# Patient Record
Sex: MALE | Race: WHITE
[De-identification: names, ages, dates, MRNs, and addresses within clinical notes are randomized per-mention and may not be internally consistent; named-entity substitution may affect disease eponyms.]

---

## 2017-05-13 ENCOUNTER — HOSPITAL ENCOUNTER (INPATIENT)
Dept: HOSPITAL 61 - ER | Age: 62
LOS: 4 days | Discharge: NURSING, REHAB OR LONG-TERM CARE | DRG: 871 | End: 2017-05-17
Attending: INTERNAL MEDICINE | Admitting: INTERNAL MEDICINE
Payer: COMMERCIAL

## 2017-05-13 VITALS — DIASTOLIC BLOOD PRESSURE: 73 MMHG | SYSTOLIC BLOOD PRESSURE: 148 MMHG

## 2017-05-13 VITALS — BODY MASS INDEX: 34.07 KG/M2 | WEIGHT: 238 LBS | HEIGHT: 70 IN

## 2017-05-13 VITALS — DIASTOLIC BLOOD PRESSURE: 53 MMHG | SYSTOLIC BLOOD PRESSURE: 91 MMHG

## 2017-05-13 DIAGNOSIS — Z95.5: ICD-10-CM

## 2017-05-13 DIAGNOSIS — B96.89: ICD-10-CM

## 2017-05-13 DIAGNOSIS — M48.00: ICD-10-CM

## 2017-05-13 DIAGNOSIS — E11.51: ICD-10-CM

## 2017-05-13 DIAGNOSIS — G92: ICD-10-CM

## 2017-05-13 DIAGNOSIS — J45.909: ICD-10-CM

## 2017-05-13 DIAGNOSIS — Z88.1: ICD-10-CM

## 2017-05-13 DIAGNOSIS — G89.29: ICD-10-CM

## 2017-05-13 DIAGNOSIS — N39.0: ICD-10-CM

## 2017-05-13 DIAGNOSIS — M54.5: ICD-10-CM

## 2017-05-13 DIAGNOSIS — I95.9: ICD-10-CM

## 2017-05-13 DIAGNOSIS — A04.7: ICD-10-CM

## 2017-05-13 DIAGNOSIS — I10: ICD-10-CM

## 2017-05-13 DIAGNOSIS — I25.10: ICD-10-CM

## 2017-05-13 DIAGNOSIS — A41.9: Primary | ICD-10-CM

## 2017-05-13 DIAGNOSIS — Z83.3: ICD-10-CM

## 2017-05-13 DIAGNOSIS — Z89.611: ICD-10-CM

## 2017-05-13 DIAGNOSIS — Z82.49: ICD-10-CM

## 2017-05-13 DIAGNOSIS — Z99.2: ICD-10-CM

## 2017-05-13 DIAGNOSIS — E78.00: ICD-10-CM

## 2017-05-13 LAB
ALBUMIN SERPL-MCNC: 3.3 G/DL (ref 3.4–5)
ALBUMIN/GLOB SERPL: 0.9 {RATIO} (ref 1–1.7)
ALP SERPL-CCNC: 89 U/L (ref 46–116)
ALT SERPL-CCNC: 27 U/L (ref 16–63)
ANION GAP SERPL CALC-SCNC: 6 MMOL/L (ref 6–14)
APTT BLD: 34 SEC (ref 24–38)
AST SERPL-CCNC: 23 U/L (ref 15–37)
BACTERIA #/AREA URNS HPF: (no result) /HPF
BARBITURATES UR-MCNC: (no result) UG/ML
BASOPHILS # BLD AUTO: 0.1 X10^3/UL (ref 0–0.2)
BASOPHILS NFR BLD: 1 % (ref 0–3)
BENZODIAZ UR-MCNC: (no result) UG/L
BILIRUB SERPL-MCNC: 0.3 MG/DL (ref 0.2–1)
BILIRUB UR QL STRIP: NEGATIVE
BUN SERPL-MCNC: 32 MG/DL (ref 8–26)
BUN/CREAT SERPL: 29 (ref 6–20)
CALCIUM SERPL-MCNC: 8.5 MG/DL (ref 8.5–10.1)
CANNABINOIDS UR-MCNC: (no result) UG/L
CHLORIDE SERPL-SCNC: 101 MMOL/L (ref 98–107)
CO2 SERPL-SCNC: 33 MMOL/L (ref 21–32)
COCAINE UR-MCNC: (no result) NG/ML
CREAT SERPL-MCNC: 1.1 MG/DL (ref 0.7–1.3)
EOSINOPHIL NFR BLD: 2 % (ref 0–3)
ERYTHROCYTE [DISTWIDTH] IN BLOOD BY AUTOMATED COUNT: 15.2 % (ref 11.5–14.5)
ETHANOL SERPL-MCNC: < 10 MG/DL (ref 0–10)
GFR SERPLBLD BASED ON 1.73 SQ M-ARVRAT: 67.4 ML/MIN
GLOBULIN SER-MCNC: 3.7 G/DL (ref 2.2–3.8)
GLUCOSE SERPL-MCNC: 148 MG/DL (ref 70–99)
GLUCOSE UR STRIP-MCNC: NEGATIVE MG/DL
HCT VFR BLD CALC: 48.2 % (ref 39–53)
HGB BLD-MCNC: 15.8 G/DL (ref 13–17.5)
INR PPP: 1 (ref 0.8–1.1)
LYMPHOCYTES # BLD: 3.3 X10^3/UL (ref 1–4.8)
LYMPHOCYTES NFR BLD AUTO: 37 % (ref 24–48)
MCH RBC QN AUTO: 28 PG (ref 25–35)
MCHC RBC AUTO-ENTMCNC: 33 G/DL (ref 31–37)
MCV RBC AUTO: 87 FL (ref 79–100)
METHADONE SERPL-MCNC: (no result) NG/ML
MONOCYTES NFR BLD: 9 % (ref 0–9)
NEUTROPHILS NFR BLD AUTO: 52 % (ref 31–73)
NITRITE UR QL STRIP: NEGATIVE
OPIATES UR-MCNC: (no result) NG/ML
PCP SERPL-MCNC: (no result) MG/DL
PH UR STRIP: 5.5 [PH]
PLATELET # BLD AUTO: 250 X10^3/UL (ref 140–400)
POTASSIUM SERPL-SCNC: 5 MMOL/L (ref 3.5–5.1)
PROT SERPL-MCNC: 7 G/DL (ref 6.4–8.2)
PROT UR STRIP-MCNC: NEGATIVE MG/DL
PROTHROMBIN TIME: 12.9 SEC (ref 11.7–14)
RBC # BLD AUTO: 5.56 X10^6/UL (ref 4.3–5.7)
RBC #/AREA URNS HPF: 0 /HPF (ref 0–2)
SODIUM SERPL-SCNC: 140 MMOL/L (ref 136–145)
SP GR UR STRIP: 1.01
SQUAMOUS #/AREA URNS LPF: (no result) /LPF
UROBILINOGEN UR-MCNC: 0.2 MG/DL
WBC # BLD AUTO: 8.8 X10^3/UL (ref 4–11)
WBC #/AREA URNS HPF: >40 /HPF (ref 0–4)

## 2017-05-13 PROCEDURE — 83735 ASSAY OF MAGNESIUM: CPT

## 2017-05-13 PROCEDURE — 85610 PROTHROMBIN TIME: CPT

## 2017-05-13 PROCEDURE — 81001 URINALYSIS AUTO W/SCOPE: CPT

## 2017-05-13 PROCEDURE — 80329 ANALGESICS NON-OPIOID 1 OR 2: CPT

## 2017-05-13 PROCEDURE — 71010: CPT

## 2017-05-13 PROCEDURE — 87186 SC STD MICRODIL/AGAR DIL: CPT

## 2017-05-13 PROCEDURE — 85730 THROMBOPLASTIN TIME PARTIAL: CPT

## 2017-05-13 PROCEDURE — 84132 ASSAY OF SERUM POTASSIUM: CPT

## 2017-05-13 PROCEDURE — 82962 GLUCOSE BLOOD TEST: CPT

## 2017-05-13 PROCEDURE — 80048 BASIC METABOLIC PNL TOTAL CA: CPT

## 2017-05-13 PROCEDURE — 93306 TTE W/DOPPLER COMPLETE: CPT

## 2017-05-13 PROCEDURE — 83605 ASSAY OF LACTIC ACID: CPT

## 2017-05-13 PROCEDURE — 93005 ELECTROCARDIOGRAM TRACING: CPT

## 2017-05-13 PROCEDURE — 87086 URINE CULTURE/COLONY COUNT: CPT

## 2017-05-13 PROCEDURE — 83880 ASSAY OF NATRIURETIC PEPTIDE: CPT

## 2017-05-13 PROCEDURE — 96375 TX/PRO/DX INJ NEW DRUG ADDON: CPT

## 2017-05-13 PROCEDURE — 84484 ASSAY OF TROPONIN QUANT: CPT

## 2017-05-13 PROCEDURE — 80053 COMPREHEN METABOLIC PANEL: CPT

## 2017-05-13 PROCEDURE — 96361 HYDRATE IV INFUSION ADD-ON: CPT

## 2017-05-13 PROCEDURE — 70450 CT HEAD/BRAIN W/O DYE: CPT

## 2017-05-13 PROCEDURE — 85027 COMPLETE CBC AUTOMATED: CPT

## 2017-05-13 PROCEDURE — 87324 CLOSTRIDIUM AG IA: CPT

## 2017-05-13 PROCEDURE — 87040 BLOOD CULTURE FOR BACTERIA: CPT

## 2017-05-13 PROCEDURE — 36415 COLL VENOUS BLD VENIPUNCTURE: CPT

## 2017-05-13 PROCEDURE — 96365 THER/PROPH/DIAG IV INF INIT: CPT

## 2017-05-13 PROCEDURE — 87641 MR-STAPH DNA AMP PROBE: CPT

## 2017-05-13 RX ADMIN — BACITRACIN SCH MLS/HR: 5000 INJECTION, POWDER, FOR SOLUTION INTRAMUSCULAR at 16:00

## 2017-05-13 RX ADMIN — BACITRACIN SCH MLS/HR: 5000 INJECTION, POWDER, FOR SOLUTION INTRAMUSCULAR at 23:50

## 2017-05-13 NOTE — RAD
Examination: Single frontal view chest



History: Shortness of breath, altered mental status.



Comparison: 5/20/2014



Findings:



The cardiomediastinal silhouette grossly appears unremarkable. There is no

acute infiltrate or visualized pneumothorax. Right clavicle hardware is

unchanged.



Impression: No acute cardiopulmonary findings.
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
## 2017-05-13 NOTE — RAD
CT head without contrast    



History: Altered mental status.



Comparison: None.



Procedure: Axial images are obtained of the head from the skull base through

the vertex without IV contrast.



Findings: Mild bilateral periventricular white matter hypodensities likely

chronic small vessel ischemic disease. The ventricles and sulci are normal for

the patient's age. No mass-effect, intracranial mass, midline shift,

hemorrhage or obvious acute infarction is identified.  Basilar cisterns are

patent.  Bone windows demonstrate no significant calvarial abnormality. The

visualized paranasal sinuses appear clear.    



Impression: 



    1. No acute intracranial process.     



PQRS Compliance Statement:



One or more of the following individualized dose reduction techniques were

utilized for this examination:

1. Automated exposure control

2. Adjustment of the mA and/or kV according to patient size

3. Use of iterative reconstruction technique
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
## 2017-05-13 NOTE — ACF
Admission Forms Criteria


                                           MENTAL STATUS CHANGE





   Clinical Indications for Inpatient Care    


                                                                     


                                                                 (Place 'X' for 

any and all applicable criteria):  





Ongoing inpatient care may be needed for 1 or more of the following(1)(2)(3)(5)(

6): 





[X]I.    Suspected serious etiology (eg, medical disorder, CNS event) of 

altered mental status


[ ]II.   Danger to self or others not manageable at lower level of care


[ ]III.  Grave disability (eg, inability to perform self care necessary at 

lower level of care)


[ ]IV. Agitation or inappropriate behavior interfering with care for primary 

condition (eg, attempting to discontinue


        lines or drains prematurely, unable to cooperate with respiratory care)


[ ]V.  Delirium [A] [D][E] as described by 1 or more of the following(26):


         [ ]a)  Delirium due to alcohol or sedative [F] withdrawal


         [ ]b)  Delirium of uncertain etiology that has not responded to 

appropriate empiric treatment


         [ ]c)  Delirium that prevents performance of a life-sustaining 

function (eg, feeding or hydrating oneself)





[ ]VI.  General contraindications and/or Inappropriate clinical situations for 

Observational Care in patients


          with Mental Status Change, when ANY ONE of the following is required:


          [ ]a)   Prediction of prolongation of LOS based on ANY ONE of the 

following may be considered as 


                    a contraindication for observational care 2, 3, 4, 5, 6, 7, 

8, 9, 10, 11


                    [ ]i)    Age > 65 yrs.


                    [ ]ii)   Patient arriving by ambulance


                    [ ]iii)  Patient with high acuity


                    [ ]iv)  Patient requiring vital sign monitoring


                    [ ]v)   Patient on IV medication


          [ ]b)   Systolic blood pressures  greater than or equal to 180mmHg 3,

12


          [ ]c)   Patient with altered mental status including delirium and 

other alteration of consciousness, (3)


          [ ]d)   Patient whose discharge disposition will be to a skilled 

nursing home or rehabilitation home should 


                   not be managed in Emergency Department Observation Unit. CMS 

rule requires 3 days hospital stay before such placement.3,13


          [ ]e)   Patient with failure to thrive due to broad array of 

etiologies 3,16,17


          [ ]f)   Inability to ambulate 3,14








Extended stay beyond goal length of stay for the primary condition may be 

needed until ALL of the following are present(3)(5):


[ ]a)  Underlying medical etiology of mental status change is absent, or has 

been established and adequately treated


[ ]b)  Danger to self or others is absent or manageable at lower level of care.


[ ]c)  Behavior crisis management, including physical or chemical restraints, 

is not required or available at lower level of car


[ ]d)  Substance or alcohol withdrawal is absent or manageable at lower level 

of care.


[ ]e)  Behavioral symptoms (eg, agitation, somnolence, inappropriate behavior) 

are absent, or are manageable at lower level of care.








The original [facility] content created by [facility] has been revised. 


The portions of the content which have been revised are identified through the 

use of italic text or in bold, and [facility] 


has neither reviewed nor approved the modified material. All other unmodified 

content is copyright  [facility].





Please see references footnoted in the original [facility] edition 

2016


Admission Criteria Met?:  Yes











TARAH BURGESS May 13, 2017 16:04

## 2017-05-14 VITALS — SYSTOLIC BLOOD PRESSURE: 179 MMHG | DIASTOLIC BLOOD PRESSURE: 83 MMHG

## 2017-05-14 VITALS — SYSTOLIC BLOOD PRESSURE: 171 MMHG | DIASTOLIC BLOOD PRESSURE: 69 MMHG

## 2017-05-14 VITALS — SYSTOLIC BLOOD PRESSURE: 175 MMHG | DIASTOLIC BLOOD PRESSURE: 86 MMHG

## 2017-05-14 VITALS — DIASTOLIC BLOOD PRESSURE: 85 MMHG | SYSTOLIC BLOOD PRESSURE: 175 MMHG

## 2017-05-14 VITALS — SYSTOLIC BLOOD PRESSURE: 158 MMHG | DIASTOLIC BLOOD PRESSURE: 85 MMHG

## 2017-05-14 VITALS — DIASTOLIC BLOOD PRESSURE: 83 MMHG | SYSTOLIC BLOOD PRESSURE: 169 MMHG

## 2017-05-14 VITALS — SYSTOLIC BLOOD PRESSURE: 217 MMHG | DIASTOLIC BLOOD PRESSURE: 92 MMHG

## 2017-05-14 RX ADMIN — OXYCODONE HYDROCHLORIDE AND ACETAMINOPHEN SCH MG: 500 TABLET ORAL at 16:00

## 2017-05-14 RX ADMIN — INSULIN DETEMIR SCH UNITS: 100 INJECTION, SOLUTION SUBCUTANEOUS at 21:51

## 2017-05-14 RX ADMIN — ESCITALOPRAM OXALATE SCH MG: 10 TABLET ORAL at 21:00

## 2017-05-14 RX ADMIN — FUROSEMIDE SCH MG: 40 TABLET ORAL at 16:00

## 2017-05-14 RX ADMIN — BACITRACIN SCH MLS/HR: 5000 INJECTION, POWDER, FOR SOLUTION INTRAMUSCULAR at 07:31

## 2017-05-14 RX ADMIN — PREGABALIN SCH MG: 75 CAPSULE ORAL at 21:00

## 2017-05-14 RX ADMIN — ATORVASTATIN CALCIUM SCH MG: 40 TABLET, FILM COATED ORAL at 21:00

## 2017-05-14 RX ADMIN — Medication SCH MG: at 21:51

## 2017-05-14 RX ADMIN — ONDANSETRON PRN MG: 2 INJECTION INTRAMUSCULAR; INTRAVENOUS at 19:10

## 2017-05-14 RX ADMIN — LOSARTAN POTASSIUM SCH MG: 50 TABLET ORAL at 16:00

## 2017-05-14 RX ADMIN — BACITRACIN SCH MLS/HR: 5000 INJECTION, POWDER, FOR SOLUTION INTRAMUSCULAR at 19:11

## 2017-05-14 RX ADMIN — HYDRALAZINE HYDROCHLORIDE PRN MG: 20 INJECTION INTRAMUSCULAR; INTRAVENOUS at 19:07

## 2017-05-14 RX ADMIN — HYDRALAZINE HYDROCHLORIDE PRN MG: 20 INJECTION INTRAMUSCULAR; INTRAVENOUS at 09:08

## 2017-05-14 RX ADMIN — MORPHINE SULFATE SCH MG: 15 TABLET, EXTENDED RELEASE ORAL at 21:00

## 2017-05-14 RX ADMIN — ASPIRIN 81 MG SCH MG: 81 TABLET ORAL at 16:00

## 2017-05-14 RX ADMIN — PROCHLORPERAZINE EDISYLATE PRN MG: 5 INJECTION INTRAMUSCULAR; INTRAVENOUS at 16:04

## 2017-05-14 RX ADMIN — Medication SCH MG: at 16:04

## 2017-05-14 RX ADMIN — ATENOLOL SCH MG: 50 TABLET ORAL at 21:00

## 2017-05-14 RX ADMIN — INSULIN ASPART SCH UNITS: 100 INJECTION, SOLUTION INTRAVENOUS; SUBCUTANEOUS at 16:30

## 2017-05-14 RX ADMIN — HYDRALAZINE HYDROCHLORIDE PRN MG: 20 INJECTION INTRAMUSCULAR; INTRAVENOUS at 13:17

## 2017-05-14 RX ADMIN — PREGABALIN SCH MG: 75 CAPSULE ORAL at 15:00

## 2017-05-14 RX ADMIN — METOCLOPRAMIDE PRN MG: 5 INJECTION, SOLUTION INTRAMUSCULAR; INTRAVENOUS at 09:54
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
## 2017-05-14 NOTE — EKG
[facility]

              [address] [place] [address]

Test Date:    2017               Test Time:    13:40:56

Pat Name:     ZUNILDA MCMANUS              Department:   

Patient ID:   [id number]           Room:         252 1

Gender:       Male                     Technician:   

:          1955               Requested By: JODIE KASPER

Order Number: [id number]            Reading MD:   Blas Garcia

                                 Measurements

Intervals                              Axis          

Rate:         53                       P:            41

TN:           202                      QRS:          -26

QRSD:         88                       T:            -54

QT:           388                                    

QTc:          366                                    

                           Interpretive Statements

SINUS RHYTHM

NON-SPECIFIC ST/T CHANGES

CANNOT RULE OUT INFERO-LATERAL ISCHEMIA

Electronically Signed On 5- 15:01:52 CDT by Blas Garcia

## 2017-05-14 NOTE — PDOC2
CARDIOLOGY CONSULT NOTE


CHEIF COMPLAINT:


Confusion


Problems:  





HPI:


62 y.o male with mmp presenting with mental status changes





Pulled fire alarm at [facility] for unclear reasons. 


Found to have urosepsis in ER


Cardiology called for elevated troponin and non-specific EKG changes


Poor historian. 


Denies any recent chest pain, dyspnea. 


Currently struggling with nausea. 


Reports prior history of PCI but also notes some abn recent stress testing. ? 

Unclear historian. 





Trop peak at 0.1





PMHX:


Biliary stents


HTN


DM2


PAD s/p R aka


CAD s/p PCI





SOCHX:


Lives at [facility]. Denies any alcohol, tob or illicits.





FAMHX:


NC





CURRENT MEDS:





Current Medications








 Medications


  (Trade)  Dose


 Ordered  Sig/Shana  Start Time


 Stop Time Status Last Admin


Dose Admin


 


 Acetaminophen


  (Tylenol)  650 mg  PRN Q4HRS  PRN  5/13/17 15:15


 5/14/17 15:14   


 


 


 Aspirin


  (Bj Aspirin)  325 mg  1X  ONCE  5/13/17 16:45


 5/13/17 16:46 DC 5/13/17 16:44


325 MG


 


 Azithromycin  250 ml @ 


 250 mls/hr  1X  ONCE  5/13/17 15:30


 5/13/17 16:29 DC 5/13/17 16:44


250 MLS/HR


 


 Azithromycin 500


 mg/Sodium Chloride  250 ml @ 


 250 mls/hr  1X  ONCE  5/13/17 15:15


 5/13/17 16:14 UNV  


 


 


 Ceftriaxone


 Sodium 1 gm/


 Sodium Chloride  50 ml @ 


 100 mls/hr  Q24H  5/14/17 09:00


     


 


 


 Ceftriaxone Sodium  50 ml @ 


 100 mls/hr  1X  ONCE  5/13/17 15:15


 5/13/17 15:44 DC 5/13/17 15:59


100 MLS/HR


 


 Morphine Sulfate  2 mg  PRN Q2HR  PRN  5/13/17 15:15


 5/14/17 15:14   


 


 


 Naloxone HCl


  (Narcan)  0.4 mg  1X  ONCE  5/13/17 15:15


 5/13/17 15:24 DC 5/13/17 15:35


0.4 MG


 


 Ondansetron HCl


  (Zofran)  4 mg  PRN Q8HRS  PRN  5/13/17 15:15


 5/14/17 15:14  5/14/17 08:39


4 MG


 


 Sodium Chloride  1,000 ml @ 


 1,000 mls/hr  1X  ONCE  5/13/17 15:15


 5/13/17 16:14 DC 5/13/17 14:00


1,000 MLS/HR











ALLERGIES:





Allergies








Coded Allergies Type Severity Reaction Last Updated Verified


 


  levofloxacin Allergy Intermediate  9/23/14 Yes











ROS:


Negative for 10/14 systems reviewed unless otherwise noted above in HPI.





PHYSICAL EXAM:


Vital Signs:





Vital Signs








  Date Time  Temp Pulse Resp B/P (MAP) Pulse Ox O2 Delivery O2 Flow Rate FiO2


 


5/14/17 02:33 98.0 72 18 171/69 (103) 93 Nasal Cannula 2.0 





 98.0       








I & O











Intake and Output 


 


 5/14/17





 07:00


 


Intake Total 2650 ml


 


Balance 2650 ml


 


 


 


Intake Oral 100 ml


 


IV Total 2550 ml


 


# Voids 6


 


# Bowel Movements 1








Physical Exam:


Gen: He is distressed from n/v


HEENT: NC/AT. EOMI


CVS: RRR. no m/r/g, distant heart sounds


PULM: Decreased breath sounds at the bases. 


ABD: Soft, mildly tender to palpation. 


EXT: No edema in the LLE. Diminished pulses.





DIAGNOSTIC TESTING:


Cr 1.1, Trop peak 0.1. 


, UA > 40 wbc


CT head and cXR negative. 


EKG with SR and non-specific ST/T changes. No clear HU





ASSESSMENT:


1. Elevated troponin in the setting of urosepsis


2. HTN


3. PAD


4. CAD s/p prior remote PCI.





PLAN:


1. Continue home meds. 


2. Currently his EKG and biomarkers in conjunction with his symptoms/

presentation do not seem to represent a ischemic insult, nonetheless given his 

risk factors, he probably warrants a stress evaluation after stabilization of 

his urosepsis issues. 


3. Supportive care with IV hydralazine for BP control until able to tolerate 

p.o. 


4. Suspect trop elevation due to infection. Continue to monitor for symptoms.











PORSCHE THOMPSON MD May 14, 2017 09:04

## 2017-05-14 NOTE — PDOC
PROGRESS NOTES


Chief Complaint


Chief Complaint


AMS








ASSESSMENT AND PLAN:


1.  Toxic encephalopathy from infect (GNR UTI).  improving


2.  UTI:  GNR in prelim report.  ceftriax empirically


3.  C.diff colitis: new dx.  start vanco PO


4.  DM: well controlled on current regimen.  


5.  HTN:  poorly controlled today (recovered from hypotension).  resume home 

regimen


6.  PAD:  hx AKA.  no acute issues


7.  Chronic back pain:  cont home pain regimen


8.  Prophylaxis: lovenox





History of Present Illness


History of Present Illness


feels poorly today.  generalized malaise, myalgias.  denies abd pain.  + 

diarrhea





Vitals


Vitals





Vital Signs








  Date Time  Temp Pulse Resp B/P (MAP) Pulse Ox O2 Delivery O2 Flow Rate FiO2


 


5/14/17 13:17  109  179/83    


 


5/14/17 11:49 98.0  22  93 Room Air  





 98.0       


 


5/14/17 08:15       2.0 











Physical Exam


General:  Alert, Cooperative, No acute distress


Heart:  Regular rate


Lungs:  Clear


Abdomen:  No tenderness, Other (decreased bowel sounds)


Extremities:  Other (trace edema L LE.  AKA R)


Skin:  No rashes





Labs


LABS





Laboratory Tests








Test


  5/13/17


15:50 5/13/17


19:45 5/13/17


19:49 5/13/17


20:45


 


Lactic Acid Level


  2.0 mmol/L


(0.4-2.0) 


  


  


 


 


Nasal Screen MRSA (PCR)


  


  Negative


(Negative) 


  


 


 


Glucose (Fingerstick)


  


  


  109 mg/dL


(70-99) 


 


 


Troponin I Quantitative


  


  


  


  0.074 ng/mL


(0.000-0.055)


 


Test


  5/13/17


21:12 5/14/17


03:30 5/14/17


06:30 5/14/17


08:41


 


Glucose (Fingerstick)


  170 mg/dL


(70-99) 


  


  151 mg/dL


(70-99)


 


Troponin I Quantitative


  


  0.100 ng/mL


(0.000-0.055) 


  


 


 


Clostridium difficile Toxin


(PCR) 


  


  Positive


(Negative) 


 


 


Test


  5/14/17


11:41 5/14/17


14:00 


  


 


 


Glucose (Fingerstick)


  175 mg/dL


(70-99) 


  


  


 


 


Troponin I Quantitative


  


  0.061 ng/mL


(0.000-0.055) 


  


 

















ELVIRA GROSS MD May 14, 2017 14:57

## 2017-05-15 VITALS — SYSTOLIC BLOOD PRESSURE: 186 MMHG | DIASTOLIC BLOOD PRESSURE: 90 MMHG

## 2017-05-15 VITALS — SYSTOLIC BLOOD PRESSURE: 124 MMHG | DIASTOLIC BLOOD PRESSURE: 84 MMHG

## 2017-05-15 VITALS — DIASTOLIC BLOOD PRESSURE: 54 MMHG | SYSTOLIC BLOOD PRESSURE: 98 MMHG

## 2017-05-15 VITALS — DIASTOLIC BLOOD PRESSURE: 83 MMHG | SYSTOLIC BLOOD PRESSURE: 191 MMHG

## 2017-05-15 VITALS — SYSTOLIC BLOOD PRESSURE: 156 MMHG | DIASTOLIC BLOOD PRESSURE: 83 MMHG

## 2017-05-15 VITALS — DIASTOLIC BLOOD PRESSURE: 86 MMHG | SYSTOLIC BLOOD PRESSURE: 173 MMHG

## 2017-05-15 LAB
ANION GAP SERPL CALC-SCNC: 12 MMOL/L (ref 6–14)
BUN SERPL-MCNC: 10 MG/DL (ref 8–26)
CALCIUM SERPL-MCNC: 8.9 MG/DL (ref 8.5–10.1)
CHLORIDE SERPL-SCNC: 105 MMOL/L (ref 98–107)
CO2 SERPL-SCNC: 28 MMOL/L (ref 21–32)
CREAT SERPL-MCNC: 0.8 MG/DL (ref 0.7–1.3)
ERYTHROCYTE [DISTWIDTH] IN BLOOD BY AUTOMATED COUNT: 15.1 % (ref 11.5–14.5)
GFR SERPLBLD BASED ON 1.73 SQ M-ARVRAT: 97.3 ML/MIN
GLUCOSE SERPL-MCNC: 219 MG/DL (ref 70–99)
HCT VFR BLD CALC: 52.6 % (ref 39–53)
HGB BLD-MCNC: 17.7 G/DL (ref 13–17.5)
MAGNESIUM SERPL-MCNC: 1.5 MG/DL (ref 1.8–2.4)
MCH RBC QN AUTO: 28 PG (ref 25–35)
MCHC RBC AUTO-ENTMCNC: 34 G/DL (ref 31–37)
MCV RBC AUTO: 84 FL (ref 79–100)
PLATELET # BLD AUTO: 264 X10^3/UL (ref 140–400)
POTASSIUM SERPL-SCNC: 2.8 MMOL/L (ref 3.5–5.1)
RBC # BLD AUTO: 6.24 X10^6/UL (ref 4.3–5.7)
SODIUM SERPL-SCNC: 145 MMOL/L (ref 136–145)
WBC # BLD AUTO: 14 X10^3/UL (ref 4–11)

## 2017-05-15 RX ADMIN — PREGABALIN SCH MG: 75 CAPSULE ORAL at 20:50

## 2017-05-15 RX ADMIN — MORPHINE SULFATE SCH MG: 15 TABLET, EXTENDED RELEASE ORAL at 20:51

## 2017-05-15 RX ADMIN — CARISOPRODOL SCH MG: 350 TABLET ORAL at 09:00

## 2017-05-15 RX ADMIN — INSULIN ASPART SCH UNITS: 100 INJECTION, SOLUTION INTRAVENOUS; SUBCUTANEOUS at 11:30

## 2017-05-15 RX ADMIN — METOCLOPRAMIDE PRN MG: 5 INJECTION, SOLUTION INTRAMUSCULAR; INTRAVENOUS at 14:23

## 2017-05-15 RX ADMIN — INSULIN ASPART SCH UNITS: 100 INJECTION, SOLUTION INTRAVENOUS; SUBCUTANEOUS at 08:52

## 2017-05-15 RX ADMIN — ATENOLOL SCH MG: 50 TABLET ORAL at 14:28

## 2017-05-15 RX ADMIN — HYDRALAZINE HYDROCHLORIDE PRN MG: 20 INJECTION INTRAMUSCULAR; INTRAVENOUS at 03:26

## 2017-05-15 RX ADMIN — FUROSEMIDE SCH MG: 40 TABLET ORAL at 09:00

## 2017-05-15 RX ADMIN — ATORVASTATIN CALCIUM SCH MG: 40 TABLET, FILM COATED ORAL at 20:49

## 2017-05-15 RX ADMIN — ATENOLOL SCH MG: 50 TABLET ORAL at 21:00

## 2017-05-15 RX ADMIN — PREGABALIN SCH MG: 75 CAPSULE ORAL at 09:00

## 2017-05-15 RX ADMIN — BACITRACIN SCH MLS/HR: 5000 INJECTION, POWDER, FOR SOLUTION INTRAMUSCULAR at 08:37

## 2017-05-15 RX ADMIN — PROCHLORPERAZINE EDISYLATE PRN MG: 5 INJECTION INTRAMUSCULAR; INTRAVENOUS at 01:43

## 2017-05-15 RX ADMIN — METOCLOPRAMIDE PRN MG: 5 INJECTION, SOLUTION INTRAMUSCULAR; INTRAVENOUS at 03:26

## 2017-05-15 RX ADMIN — POTASSIUM CHLORIDE SCH MLS/HR: 7.46 INJECTION, SOLUTION INTRAVENOUS at 12:10

## 2017-05-15 RX ADMIN — ESCITALOPRAM OXALATE SCH MG: 10 TABLET ORAL at 20:49

## 2017-05-15 RX ADMIN — INSULIN DETEMIR SCH UNITS: 100 INJECTION, SOLUTION SUBCUTANEOUS at 21:03

## 2017-05-15 RX ADMIN — BACITRACIN SCH MLS/HR: 5000 INJECTION, POWDER, FOR SOLUTION INTRAMUSCULAR at 23:03

## 2017-05-15 RX ADMIN — POTASSIUM CHLORIDE SCH MLS/HR: 7.46 INJECTION, SOLUTION INTRAVENOUS at 10:06

## 2017-05-15 RX ADMIN — MORPHINE SULFATE SCH MG: 15 TABLET, EXTENDED RELEASE ORAL at 14:25

## 2017-05-15 RX ADMIN — ASPIRIN 81 MG SCH MG: 81 TABLET ORAL at 09:00

## 2017-05-15 RX ADMIN — Medication SCH MG: at 18:27

## 2017-05-15 RX ADMIN — Medication SCH MG: at 08:37

## 2017-05-15 RX ADMIN — INSULIN ASPART SCH UNITS: 100 INJECTION, SOLUTION INTRAVENOUS; SUBCUTANEOUS at 07:30

## 2017-05-15 RX ADMIN — POTASSIUM CHLORIDE SCH MLS/HR: 7.46 INJECTION, SOLUTION INTRAVENOUS at 11:05

## 2017-05-15 RX ADMIN — PREGABALIN SCH MG: 75 CAPSULE ORAL at 14:29

## 2017-05-15 RX ADMIN — OXYCODONE HYDROCHLORIDE AND ACETAMINOPHEN SCH MG: 500 TABLET ORAL at 09:00

## 2017-05-15 RX ADMIN — POTASSIUM CHLORIDE SCH MLS/HR: 7.46 INJECTION, SOLUTION INTRAVENOUS at 08:00

## 2017-05-15 RX ADMIN — INSULIN ASPART SCH UNITS: 100 INJECTION, SOLUTION INTRAVENOUS; SUBCUTANEOUS at 18:37

## 2017-05-15 RX ADMIN — ONDANSETRON PRN MG: 2 INJECTION INTRAMUSCULAR; INTRAVENOUS at 05:38

## 2017-05-15 RX ADMIN — Medication SCH MG: at 20:48

## 2017-05-15 RX ADMIN — LOSARTAN POTASSIUM SCH MG: 50 TABLET ORAL at 13:56

## 2017-05-15 RX ADMIN — Medication SCH MG: at 17:00

## 2017-05-15 NOTE — EKG
[facility]

              [address] [place] [address]

Test Date:    2017-05-15               Test Time:    07:59:45

Pat Name:     ZUNILDA MCMANUS              Department:   

Patient ID:   [id number]           Room:         534 1

Gender:       M                        Technician:   

:          [id number]               Requested By: JODIE KASPER

Order Number: [id number]            Reading MD:   Blas Garcia

                                 Measurements

Intervals                              Axis          

Rate:         76                       P:            43

CT:           188                      QRS:          -39

QRSD:         90                       T:            -65

QT:           448                                    

QTc:          509                                    

                           Interpretive Statements

SINUS RHYTHM

ANTERIOR ISCHEMIA OR LEFT VENTRICULAR STRAIN

LATERAL ISCHEMIA OR LEFT VENTRICULAR STRAIN

POSSIBLE INFERIOR INFARCT



Electronically Signed On 2017 10:43:44 CDT by Blas Garcia

## 2017-05-15 NOTE — PDOC
PROGRESS NOTES


Chief Complaint


Chief Complaint


 


1.  Toxic encephalopathy from infection


2.  UTI:  GNR  ceftriaxone


3.  C.diff colitis: new dx.  start vanco PO


4.  DM: well controlled on current regimen.  


5.  HTN:  poorly controlled today (recovered from hypotension).  resume home 

regimen


6.  PAD:  hx AKA.  no acute issues


7.  Chronic back pain:  cont home pain regimen


8.  Prophylaxis: lovenox





History of Present Illness


History of Present Illness


feels poorly today.  generalized malaise, myalgias.  denies abd pain.  + 

diarrhea





Vitals


Vitals





Vital Signs








  Date Time  Temp Pulse Resp B/P (MAP) Pulse Ox O2 Delivery O2 Flow Rate FiO2


 


5/15/17 05:39  97  183/79    


 


5/15/17 03:00 99.0  20  92 Nasal Cannula 2.0 





 99.0       











Physical Exam


General:  Alert, Cooperative, No acute distress


Heart:  Regular rate


Lungs:  Clear


Abdomen:  No tenderness, Other (decreased bowel sounds)


Extremities:  Other (trace edema L LE.  AKA R)


Skin:  No rashes





Labs


LABS





Laboratory Tests








Test


  5/14/17


17:20 5/14/17


20:55 5/15/17


05:40 5/15/17


07:21


 


Glucose (Fingerstick)


  206 mg/dL


(70-99) 225 mg/dL


(70-99) 


  214 mg/dL


(70-99)


 


White Blood Count


  


  


  14.0 x10^3/uL


(4.0-11.0) 


 


 


Red Blood Count


  


  


  6.24 x10^6/uL


(4.30-5.70) 


 


 


Hemoglobin


  


  


  17.7 g/dL


(13.0-17.5) 


 


 


Hematocrit


  


  


  52.6 %


(39.0-53.0) 


 


 


Mean Corpuscular Volume   84 fL ()  


 


Mean Corpuscular Hemoglobin   28 pg (25-35)  


 


Mean Corpuscular Hemoglobin


Concent 


  


  34 g/dL


(31-37) 


 


 


Red Cell Distribution Width


  


  


  15.1 %


(11.5-14.5) 


 


 


Platelet Count


  


  


  264 x10^3/uL


(140-400) 


 


 


Sodium Level


  


  


  145 mmol/L


(136-145) 


 


 


Potassium Level


  


  


  2.8 mmol/L


(3.5-5.1) 


 


 


Chloride Level


  


  


  105 mmol/L


() 


 


 


Carbon Dioxide Level


  


  


  28 mmol/L


(21-32) 


 


 


Anion Gap   12 (6-14)  


 


Blood Urea Nitrogen


  


  


  10 mg/dL


(8-26) 


 


 


Creatinine


  


  


  0.8 mg/dL


(0.7-1.3) 


 


 


Estimated GFR


(Cockcroft-Gault) 


  


  97.3 


  


 


 


Glucose Level


  


  


  219 mg/dL


(70-99) 


 


 


Calcium Level


  


  


  8.9 mg/dL


(8.5-10.1) 


 


 


Magnesium Level


  


  


  1.5 mg/dL


(1.8-2.4) 


 


 


Test


  5/15/17


10:50 


  


  


 


 


Glucose (Fingerstick)


  225 mg/dL


(70-99) 


  


  


 











Review of Systems


Review of Systems


+ nausea


on clears, poor PO intake


has not worn his r leg prosthesis in a long time, maybe years





Assessment and Plan


Assessmemt and Plan


Problems


Medical Problems:


(1) Altered mental status


Status: Acute  





(2) Hypotension


Status: Acute  








Problems:  





Comment


Review of Relevant


I have reviewed the following items daria (where applicable) has been applied.


Labs





Laboratory Tests








Test


  5/13/17


15:50 5/13/17


19:45 5/13/17


19:49 5/13/17


20:45


 


Lactic Acid Level


  2.0 mmol/L


(0.4-2.0) 


  


  


 


 


Nasal Screen MRSA (PCR)


  


  Negative


(Negative) 


  


 


 


Glucose (Fingerstick)


  


  


  109 mg/dL


(70-99) 


 


 


Troponin I Quantitative


  


  


  


  0.074 ng/mL


(0.000-0.055)


 


Test


  5/13/17


21:12 5/14/17


03:30 5/14/17


06:30 5/14/17


08:41


 


Glucose (Fingerstick)


  170 mg/dL


(70-99) 


  


  151 mg/dL


(70-99)


 


Troponin I Quantitative


  


  0.100 ng/mL


(0.000-0.055) 


  


 


 


Clostridium difficile Toxin


(PCR) 


  


  Positive


(Negative) 


 


 


Test


  5/14/17


11:41 5/14/17


14:00 5/14/17


17:20 5/14/17


20:55


 


Glucose (Fingerstick)


  175 mg/dL


(70-99) 


  206 mg/dL


(70-99) 225 mg/dL


(70-99)


 


Troponin I Quantitative


  


  0.061 ng/mL


(0.000-0.055) 


  


 


 


Test


  5/15/17


05:40 5/15/17


07:21 5/15/17


10:50 


 


 


White Blood Count


  14.0 x10^3/uL


(4.0-11.0) 


  


  


 


 


Red Blood Count


  6.24 x10^6/uL


(4.30-5.70) 


  


  


 


 


Hemoglobin


  17.7 g/dL


(13.0-17.5) 


  


  


 


 


Hematocrit


  52.6 %


(39.0-53.0) 


  


  


 


 


Mean Corpuscular Volume 84 fL ()    


 


Mean Corpuscular Hemoglobin 28 pg (25-35)    


 


Mean Corpuscular Hemoglobin


Concent 34 g/dL


(31-37) 


  


  


 


 


Red Cell Distribution Width


  15.1 %


(11.5-14.5) 


  


  


 


 


Platelet Count


  264 x10^3/uL


(140-400) 


  


  


 


 


Sodium Level


  145 mmol/L


(136-145) 


  


  


 


 


Potassium Level


  2.8 mmol/L


(3.5-5.1) 


  


  


 


 


Chloride Level


  105 mmol/L


() 


  


  


 


 


Carbon Dioxide Level


  28 mmol/L


(21-32) 


  


  


 


 


Anion Gap 12 (6-14)    


 


Blood Urea Nitrogen


  10 mg/dL


(8-26) 


  


  


 


 


Creatinine


  0.8 mg/dL


(0.7-1.3) 


  


  


 


 


Estimated GFR


(Cockcroft-Gault) 97.3 


  


  


  


 


 


Glucose Level


  219 mg/dL


(70-99) 


  


  


 


 


Calcium Level


  8.9 mg/dL


(8.5-10.1) 


  


  


 


 


Magnesium Level


  1.5 mg/dL


(1.8-2.4) 


  


  


 


 


Glucose (Fingerstick)


  


  214 mg/dL


(70-99) 225 mg/dL


(70-99) 


 








Laboratory Tests








Test


  5/14/17


17:20 5/14/17


20:55 5/15/17


05:40 5/15/17


07:21


 


Glucose (Fingerstick)


  206 mg/dL


(70-99) 225 mg/dL


(70-99) 


  214 mg/dL


(70-99)


 


White Blood Count


  


  


  14.0 x10^3/uL


(4.0-11.0) 


 


 


Red Blood Count


  


  


  6.24 x10^6/uL


(4.30-5.70) 


 


 


Hemoglobin


  


  


  17.7 g/dL


(13.0-17.5) 


 


 


Hematocrit


  


  


  52.6 %


(39.0-53.0) 


 


 


Mean Corpuscular Volume   84 fL ()  


 


Mean Corpuscular Hemoglobin   28 pg (25-35)  


 


Mean Corpuscular Hemoglobin


Concent 


  


  34 g/dL


(31-37) 


 


 


Red Cell Distribution Width


  


  


  15.1 %


(11.5-14.5) 


 


 


Platelet Count


  


  


  264 x10^3/uL


(140-400) 


 


 


Sodium Level


  


  


  145 mmol/L


(136-145) 


 


 


Potassium Level


  


  


  2.8 mmol/L


(3.5-5.1) 


 


 


Chloride Level


  


  


  105 mmol/L


() 


 


 


Carbon Dioxide Level


  


  


  28 mmol/L


(21-32) 


 


 


Anion Gap   12 (6-14)  


 


Blood Urea Nitrogen


  


  


  10 mg/dL


(8-26) 


 


 


Creatinine


  


  


  0.8 mg/dL


(0.7-1.3) 


 


 


Estimated GFR


(Cockcroft-Gault) 


  


  97.3 


  


 


 


Glucose Level


  


  


  219 mg/dL


(70-99) 


 


 


Calcium Level


  


  


  8.9 mg/dL


(8.5-10.1) 


 


 


Magnesium Level


  


  


  1.5 mg/dL


(1.8-2.4) 


 


 


Test


  5/15/17


10:50 


  


  


 


 


Glucose (Fingerstick)


  225 mg/dL


(70-99) 


  


  


 








Microbiology


5/13/17 Blood Culture - Preliminary, Resulted


          NO GROWTH AFTER 1 DAY


5/13/17 Urine Culture - Final, Complete


          


5/13/17 Urine Culture Result 1 ([facility]) - Final, Complete


          


5/13/17 Antimicrobic Susceptibility - Final, Complete


Medications





Current Medications


Ondansetron HCl (Zofran) 4 mg PRN Q8HRS  PRN IV NAUSEA/VOMITING Last 

administered on 5/14/17at 08:39;  Start 5/13/17 at 15:15;  Stop 5/14/17 at 15:21

;  Status DC


Morphine Sulfate 2 mg PRN Q2HR  PRN IV PAIN;  Start 5/13/17 at 15:15;  Stop 5/14 /17 at 15:21;  Status DC


Sodium Chloride 1,000 ml @  125 mls/hr Q8H IV  Last administered on 5/14/17at 07

:31;  Start 5/13/17 at 15:09;  Stop 5/14/17 at 15:21;  Status DC


Acetaminophen (Tylenol) 650 mg PRN Q4HRS  PRN PO FEVER;  Start 5/13/17 at 15:15

;  Stop 5/14/17 at 15:21;  Status DC


Naloxone HCl (Narcan) 0.4 mg 1X  ONCE IV  Last administered on 5/13/17at 15:35;

  Start 5/13/17 at 15:15;  Stop 5/13/17 at 15:24;  Status DC


Sodium Chloride 1,000 ml @  1,000 mls/hr 1X  ONCE IV  Last administered on 5/13/ 17at 14:00;  Start 5/13/17 at 15:15;  Stop 5/13/17 at 16:14;  Status DC


Ceftriaxone Sodium 50 ml @  100 mls/hr 1X  ONCE IV  Last administered on 5/13/ 17at 15:59;  Start 5/13/17 at 15:15;  Stop 5/13/17 at 15:44;  Status DC


Azithromycin 500 mg/Sodium Chloride 250 ml @  250 mls/hr 1X  ONCE IV ;  Start 5/ 13/17 at 15:15;  Stop 5/13/17 at 16:14;  Status UNV


Azithromycin 250 ml @  250 mls/hr 1X  ONCE IV  Last administered on 5/13/17at 16

:44;  Start 5/13/17 at 15:30;  Stop 5/13/17 at 16:29;  Status DC


Aspirin (Bj Aspirin) 325 mg 1X  ONCE PO  Last administered on 5/13/17at 16:44

;  Start 5/13/17 at 16:45;  Stop 5/13/17 at 16:46;  Status DC


Ceftriaxone Sodium 1 gm/ Sodium Chloride 50 ml @  100 mls/hr Q24H IV  Last 

administered on 5/15/17at 11:10;  Start 5/14/17 at 09:00


Hydralazine HCl (Apresoline) 10 mg PRN Q4HRS  PRN IVP ELEVATED BP, SEE COMMENTS 

Last administered on 5/15/17at 03:26;  Start 5/14/17 at 09:00


Amlodipine Besylate (Norvasc) 10 mg DAILY PO ;  Start 5/14/17 at 09:00


Atenolol (Tenormin) 25 mg BID PO ;  Start 5/14/17 at 09:00;  Stop 5/14/17 at 15:

43;  Status DC


Atorvastatin Calcium (Lipitor) 40 mg QHS PO ;  Start 5/14/17 at 21:00;  Stop 5/ 14/17 at 21:00;  Status DC


Losartan Potassium (Cozaar) 50 mg DAILY PO ;  Start 5/14/17 at 09:00;  Stop 5/14 /17 at 15:43;  Status DC


Metoclopramide HCl (Reglan) 10 mg PRN Q8HRS  PRN IV NAUSEA/VOMITING Last 

administered on 5/15/17at 03:26;  Start 5/14/17 at 10:00


Vancomycin HCl 250 mg [id number] PO  Last administered on 5/15/17at 08:37;  Start 5 /14/17 at 17:00


Acetaminophen (Tylenol) 650 mg PRN Q6HRS  PRN PO PAIN;  Start 5/14/17 at 15:00


Amlodipine Besylate (Norvasc) 10 mg DAILY PO ;  Start 5/15/17 at 09:00;  Status 

UNV


Ascorbic Acid (Vitamin C) 500 mg DAILY PO ;  Start 5/14/17 at 16:00


Aspirin (Children'S Aspirin) 81 mg DAILY PO ;  Start 5/14/17 at 16:00


Atenolol (Tenormin) 50 mg BID PO ;  Start 5/14/17 at 21:00


Carisoprodol (Soma) 350 mg PRN Q8HRS  PRN PO PAIN;  Start 5/14/17 at 15:00


Carisoprodol (Soma) 350 mg DAILY PO ;  Start 5/15/17 at 09:00


Escitalopram Oxalate (Lexapro) 10 mg HS PO ;  Start 5/14/17 at 21:00


Furosemide (Lasix) 40 mg DAILY PO ;  Start 5/14/17 at 16:00


Losartan Potassium (Cozaar) 100 mg DAILY PO ;  Start 5/14/17 at 16:00


Morphine Sulfate (Ms Contin) 45 mg BID PO ;  Start 5/14/17 at 21:00


Nitroglycerin (Nitrostat) 0.4 mg PRN Q5MIN  PRN SL CHEST PAIN;  Start 5/14/17 

at 15:00


Pregabalin (Lyrica) 75 mg TID PO ;  Start 5/14/17 at 15:00


Insulin Detemir (Levemir) 60 units QHS SQ  Last administered on 5/14/17at 21:51

;  Start 5/14/17 at 21:00


Insulin Aspart (NovoLOG) 6 units TIDAC SQ ;  Start 5/14/17 at 16:30


Insulin Aspart (NovoLOG)  TIDAC SQ  Last administered on 5/15/17at 08:52;  

Start 5/14/17 at 16:30


Oxycodone HCl (Roxicodone) 15 mg PRN Q6HRS  PRN PO PAIN;  Start 5/14/17 at 15:30


Atorvastatin Calcium (Lipitor) 80 mg QHS PO ;  Start 5/14/17 at 21:00


Non-Formulary Medication 2.5 gm QODAY TP ;  Start 5/16/17 at 09:00;  Status UNV


Ondansetron HCl (Zofran) 8 mg PRN Q8HRS  PRN IV NAUSEA/VOMITING Last 

administered on 5/15/17at 05:38;  Start 5/14/17 at 15:30


Prochlorperazine Edisylate (Compazine) 10 mg PRN Q8HRS  PRN IV NAUSEA/VOMITING 

Last administered on 5/15/17at 01:43;  Start 5/14/17 at 15:30


Sodium Chloride 1,000 ml @  75 mls/hr [id number] IV  Last administered on 5/15/

17at 08:37;  Start 5/14/17 at 18:30


Labetalol HCl (Normodyne) 20 mg PRN Q6HRS  PRN IVP HYPERTENSION, SEE COMMENTS 

Last administered on 5/15/17at 05:39;  Start 5/14/17 at 20:15


Potassium Chloride (Klor-Con) 40 meq 1X  ONCE PO ;  Start 5/15/17 at 07:00;  

Stop 5/15/17 at 07:00;  Status DC


Potassium Chloride 100 ml @  100 mls/hr Q1H IV  Last administered on 5/15/17at 

08:00;  Start 5/15/17 at 07:00;  Stop 5/15/17 at 10:59;  Status DC


Magnesium Sulfate/ Dextrose 50 ml @ 25 mls/hr 1X  ONCE IV ;  Start 5/15/17 at 13

:00;  Stop 5/15/17 at 14:59


Potassium Chloride (Klor-Con) 40 meq 1X  ONCE PO ;  Start 5/15/17 at 13:45;  

Stop 5/15/17 at 13:46;  Status DC





Active Scripts


Active


Reported


Zinc Oxide 30 Gm Oint...g. 30 Gm TP BID


     karlos inner buttocks


Vitamin D3 (Cholecalciferol (Vitamin D3)) 5,000 Unit Tablet 1 Tab PO DAILY


Stress B With Zinc Tablet (Multivits,Stress Formula/Zinc) 1 Each Tablet 1 Each 

PO DAILY


Soma (Carisoprodol) 350 Mg Tablet 1 Tab PO DAILY


Potassium Chloride 10 Meq Capsule.er 10 Meq PO DAILY


Oxycodone Hcl 15 Mg Tablet 1 Tab PO Q6HRS PRN


Norvasc (Amlodipine Besylate) 10 Mg Tablet 10 Mg PO DAILY


Nitrostat (Nitroglycerin) 0.4 Mg Tab.subl 0.4 Mg SL PRN Q5MIN PRN


Ms Contin (Morphine Sulfate) 30 Mg Tablet.er 15 Tab PO BID


Magnesium (Magnesium Oxide) 400 Mg Capsule 400 Mg PO DAILY


Lyrica (Pregabalin) 75 Mg Capsule 75 Mg PO TID


Lexapro (Escitalopram Oxalate) 5 Mg Tablet 1 Tab PO HS


Escitalopram Oxalate 10 Mg Tablet 1 Tab PO HS


Lasix (Furosemide) 40 Mg Tablet 1 Tab PO DAILY


Lantus (Insulin Glargine,Hum.rec.anlog) 100 Unit/1 Ml Vial 60 Unit SQ DAILY


Imodium A-D (Loperamide HCl) 2 Mg Capsule 2 Mg PO PRN PRN


     after each loose stool up to 16mg in 24 hours


Humalog (Insulin Lispro) 100 Unit/1 Ml Insuln.pen 0-8 Unit SQ TIDAC


     if fs bs


     150-200= 1 unit


     201-250= 2 units


     250-300= 4 units


     301-350= 6 units


     351-400= 8 units


     >401 call dr. Silverman (Insulin Lispro) 100 Unit/1 Ml Cartridge 6 Unit SQ TIDAC


Baclofen 10 Mg Tablet 1 Tab PO BID


Ascorbic Acid 500 Mg Tablet 500 Mg PO DAILY


Androgel (Testosterone) 5 Gm Gel.packet 2.5 Gm TP QODAY


Tylenol (Acetaminophen) 325 Mg Tablet 650 Mg PO Q6HRS PRN


Carisoprodol 350 Mg Tablet 350 Mg PO Q8HRS PRN


Temazepam 30 Mg Capsule 1 Cap PO QHS


Crestor (Rosuvastatin Calcium) 40 Mg Tablet 20 Mg PO QHS


Losartan Potassium 50 Mg Tablet 100 Mg PO DAILY


Atenolol 50 Mg Tablet 1 Tab PO BID


Metformin Hcl 500 Mg Tablet 1 Tab PO BID


Aspirin 81 Mg Tab.chew 81 Mg PO DAILY


Sennosides-Docusate Sodium Tab (Sennosides/Docusate Sodium) 1 Each Tablet 2 

Each PO BID


Miralax (Polyethylene Glycol 3350) 17 Gm Powd.pack 17 Gm PO PRN PRN


Vitals/I & O





Vital Sign - Last 24 Hours








 5/14/17 5/14/17 5/14/17 5/14/17





 15:45 19:06 19:07 19:42


 


Temp 98.4 98.3  





 98.4 98.3  


 


Pulse 108 102 102 


 


Resp 20 20  


 


B/P (MAP) 169/83 (111) 217/92 (133) 217/92 


 


Pulse Ox 93 96  


 


O2 Delivery Room Air Room Air  Nasal Cannula


 


O2 Flow Rate    2.0


 


    





    





 5/14/17 5/14/17 5/15/17 5/15/17





 19:43 23:00 03:00 03:26


 


Temp  98.3 99.0 





  98.3 99.0 


 


Pulse 111 96 94 94


 


Resp  22 20 


 


B/P (MAP) 175/85 (115) 158/85 (109) 186/90 (122) 186/90


 


Pulse Ox  93 92 


 


O2 Delivery  Nasal Cannula Nasal Cannula 


 


O2 Flow Rate  2.0 2.0 


 


    





    





 5/15/17   





 05:39   


 


Pulse 97   


 


B/P (MAP) 183/79   














Intake and Output   


 


 5/14/17 5/14/17 5/15/17





 15:00 23:00 07:00


 


Intake Total   665 ml


 


Output Total  300 ml 50 ml


 


Balance  -300 ml 615 ml

















MOY LÓPEZ MD May 15, 2017 14:15

## 2017-05-15 NOTE — PDOC
[facility]ARD[facility] Progress Notes


Date and Time


Date of Service


05/15/2017


Time of Evaluation


1200





Subjective


Subjective:  No Chest Pain, No Palpitations, No Dizziness, Other (cough)





Vitals


Vitals





Vital Signs








  Date Time  Temp Pulse Resp B/P (MAP) Pulse Ox O2 Delivery O2 Flow Rate FiO2


 


5/15/17 05:39  97  183/79    


 


5/15/17 03:00 99.0  20  92 Nasal Cannula 2.0 





 99.0       








Weight


Weight [ ]





Input and Output


Intake and Output











Intake and Output 


 


 5/15/17





 07:00


 


Intake Total 665 ml


 


Output Total 350 ml


 


Balance 315 ml


 


 


 


IV Total 665 ml


 


Output Emesis 350 ml


 


# Voids 2


 


# Bowel Movements 5











Laboratory


Labs





Laboratory Tests








Test


  5/14/17


14:00 5/14/17


17:20 5/14/17


20:55 5/15/17


05:40


 


Troponin I Quantitative


  0.061 ng/mL


(0.000-0.055) 


  


  


 


 


Glucose (Fingerstick)


  


  206 mg/dL


(70-99) 225 mg/dL


(70-99) 


 


 


White Blood Count


  


  


  


  14.0 x10^3/uL


(4.0-11.0)


 


Red Blood Count


  


  


  


  6.24 x10^6/uL


(4.30-5.70)


 


Hemoglobin


  


  


  


  17.7 g/dL


(13.0-17.5)


 


Hematocrit


  


  


  


  52.6 %


(39.0-53.0)


 


Mean Corpuscular Volume    84 fL () 


 


Mean Corpuscular Hemoglobin    28 pg (25-35) 


 


Mean Corpuscular Hemoglobin


Concent 


  


  


  34 g/dL


(31-37)


 


Red Cell Distribution Width


  


  


  


  15.1 %


(11.5-14.5)


 


Platelet Count


  


  


  


  264 x10^3/uL


(140-400)


 


Sodium Level


  


  


  


  145 mmol/L


(136-145)


 


Potassium Level


  


  


  


  2.8 mmol/L


(3.5-5.1)


 


Chloride Level


  


  


  


  105 mmol/L


()


 


Carbon Dioxide Level


  


  


  


  28 mmol/L


(21-32)


 


Anion Gap    12 (6-14) 


 


Blood Urea Nitrogen


  


  


  


  10 mg/dL


(8-26)


 


Creatinine


  


  


  


  0.8 mg/dL


(0.7-1.3)


 


Estimated GFR


(Cockcroft-Gault) 


  


  


  97.3 


 


 


Glucose Level


  


  


  


  219 mg/dL


(70-99)


 


Calcium Level


  


  


  


  8.9 mg/dL


(8.5-10.1)


 


Magnesium Level


  


  


  


  1.5 mg/dL


(1.8-2.4)


 


Test


  5/15/17


07:21 5/15/17


10:50 


  


 


 


Glucose (Fingerstick)


  214 mg/dL


(70-99) 225 mg/dL


(70-99) 


  


 











Microbiology


Micro





Microbiology


5/13/17 Blood Culture - Preliminary, Resulted


          NO GROWTH AFTER 1 DAY


5/13/17 Urine Culture - Preliminary, Resulted


          


5/13/17 Urine Culture Result 1 ([facility]) - Preliminary, Resulted





Physical Exam


HEENT:  Neck Supple W Full Motion


Chest:  Symmetric, Other (reproducible CP in lower 1/3 of sternum)


LUNGS:  Other (decreased in bases anteriorly)


Heart:  S1S2, RRR, no murmurs


Abdomen:  Other (distended abdomen)


Extremities:  Other (right AKA amputation; 2+ LLE edema)


Neurology:  alert





Assessment


Assessment


1. Elevated troponin in the setting of sepsis


   troponin peaked at 0.1


   associated with encephalopathy   


   will request ST done in the last 2 years @ [facility]


   echo to evaluate for WMA


   ? reproducible 





2. HTN


   not controlled; has refused meds


   lowered parameters on labetalol 


   also has IV hydralazine prn





3. PAD


   previous right AKA





4. CAD s/p prior remote PCI


   requesting records from [facility] May 15, 2017 12:14

## 2017-05-15 NOTE — CARD
--------------- APPROVED REPORT --------------





EXAM: Two-dimensional and M-mode echocardiogram with Doppler and color Doppler.



Other Information 

Quality : GoodHR: 100bpm

Rhythm : Tachycardia



INDICATION

Elevated troponin



RISK FACTORS

Obesity   

Diabetes

Smoking 



2D DIMENSIONS 

RVDd2.9 (2.9-3.5cm)Left Atrium(2D)3.9 (1.6-4.0cm)

IVSd1.2 (0.7-1.1cm)Aortic Root(2D)3.0 (2.0-3.7cm)

LVDd4.7 (3.9-5.9cm)LVOT Diameter2.5 (1.8-2.4cm)

PWd1.2 (0.7-1.1cm)LVDs3.2 (2.5-4.0cm)

FS (%) 31.8 %SV61.3 ml

LVEF(%)59.9 (>50%)



Aortic Valve

AoV Peak Mikhail.179.3cm/sAoV VTI23.1cm

AO Peak GR.12.9mmHgLVOT Peak Mikhail.123.3cm/s

AO Mean GR.5mmHgAVA (VMAX)3.25cm2



Mitral Valve

MV E Umjwbcdk02.7cm/sMV E Peak Gr.11mmHg

MV DECEL [id number] A Vsxadlqr422.5cm/s

MV E Mean Gr.4mmHgE/A  Ratio0.5

MV A [id number]



Pulmonary Valve

PV Peak Tgtsgplo266.3cm/s



 LEFT VENTRICLE 

The left ventricle is normal size. There is mild concentric left ventricular hypertrophy. The left ve
ntricular systolic function is normal and the ejection fraction is within normal range. The Ejection 
Fraction is 60-65%. There is normal LV segmental wall motion. Transmitral Doppler flow pattern is Gra
de I-abnormal relaxation pattern.



 RIGHT VENTRICLE 

The right ventricle is normal size. There is normal right ventricular wall thickness. The right ventr
icular systolic function is normal.



 ATRIA 

The left atrium is not well visualized. The right atrium size is normal. The interatrial septum is in
tact with no evidence for an atrial septal defect or patent foramen ovale as noted on 2-D or Doppler 
imaging.



 AORTIC VALVE 

The aortic valve is moderately sclerotic. The aortic valve is trileaflet. Doppler and Color Flow reve
aled no significant aortic regurgitation. There is no significant aortic valvular stenosis.



 MITRAL VALVE 

The mitral valve leaflets are thickened. There is no evidence of mitral valve prolapse. There is no m
itral valve stenosis. Doppler and Color Flow revealed no mitral valve regurgitation noted.



 TRICUSPID VALVE 

Doppler and Color Flow revealed no tricuspid valve regurgitation noted. There is no pulmonary hyperte
nsion.



 PULMONIC VALVE 

Doppler and Color Flow revealed no pulmonic valvular regurgitation. There is no pulmonic valvular shahnaz
nosis.



 GREAT VESSELS 

The aortic root is normal in size. The ascending aorta is normal in size. The IVC was obscured, unabl
e to assess.



 PERICARDIAL EFFUSION 

There is no evidence of significant pericardial effusion.



Critical Notification

Critical Value: No



<Conclusion>

The left ventricular systolic function is normal and the ejection fraction is within normal range. Th
e Ejection Fraction is 60-65%.

There is normal LV segmental wall motion.

## 2017-05-16 VITALS — SYSTOLIC BLOOD PRESSURE: 105 MMHG | DIASTOLIC BLOOD PRESSURE: 57 MMHG

## 2017-05-16 VITALS — SYSTOLIC BLOOD PRESSURE: 115 MMHG | DIASTOLIC BLOOD PRESSURE: 50 MMHG

## 2017-05-16 VITALS — SYSTOLIC BLOOD PRESSURE: 121 MMHG | DIASTOLIC BLOOD PRESSURE: 60 MMHG

## 2017-05-16 VITALS — DIASTOLIC BLOOD PRESSURE: 61 MMHG | SYSTOLIC BLOOD PRESSURE: 121 MMHG

## 2017-05-16 VITALS — DIASTOLIC BLOOD PRESSURE: 72 MMHG | SYSTOLIC BLOOD PRESSURE: 135 MMHG

## 2017-05-16 LAB
ANION GAP SERPL CALC-SCNC: 6 MMOL/L (ref 6–14)
BUN SERPL-MCNC: 10 MG/DL (ref 8–26)
CALCIUM SERPL-MCNC: 7.8 MG/DL (ref 8.5–10.1)
CHLORIDE SERPL-SCNC: 106 MMOL/L (ref 98–107)
CO2 SERPL-SCNC: 28 MMOL/L (ref 21–32)
CREAT SERPL-MCNC: 0.7 MG/DL (ref 0.7–1.3)
ERYTHROCYTE [DISTWIDTH] IN BLOOD BY AUTOMATED COUNT: 15.6 % (ref 11.5–14.5)
GFR SERPLBLD BASED ON 1.73 SQ M-ARVRAT: 113.5 ML/MIN
GLUCOSE SERPL-MCNC: 113 MG/DL (ref 70–99)
HCT VFR BLD CALC: 45.8 % (ref 39–53)
HGB BLD-MCNC: 15 G/DL (ref 13–17.5)
MAGNESIUM SERPL-MCNC: 1.9 MG/DL (ref 1.8–2.4)
MCH RBC QN AUTO: 28 PG (ref 25–35)
MCHC RBC AUTO-ENTMCNC: 33 G/DL (ref 31–37)
MCV RBC AUTO: 86 FL (ref 79–100)
PLATELET # BLD AUTO: 243 X10^3/UL (ref 140–400)
POTASSIUM SERPL-SCNC: 3.7 MMOL/L (ref 3.5–5.1)
RBC # BLD AUTO: 5.35 X10^6/UL (ref 4.3–5.7)
SODIUM SERPL-SCNC: 140 MMOL/L (ref 136–145)
WBC # BLD AUTO: 13.5 X10^3/UL (ref 4–11)

## 2017-05-16 RX ADMIN — FUROSEMIDE SCH MG: 40 TABLET ORAL at 08:51

## 2017-05-16 RX ADMIN — PREGABALIN SCH MG: 75 CAPSULE ORAL at 14:16

## 2017-05-16 RX ADMIN — Medication SCH MG: at 08:54

## 2017-05-16 RX ADMIN — MORPHINE SULFATE SCH MG: 15 TABLET, EXTENDED RELEASE ORAL at 08:45

## 2017-05-16 RX ADMIN — INSULIN ASPART SCH UNITS: 100 INJECTION, SOLUTION INTRAVENOUS; SUBCUTANEOUS at 16:30

## 2017-05-16 RX ADMIN — BACITRACIN SCH MLS/HR: 5000 INJECTION, POWDER, FOR SOLUTION INTRAMUSCULAR at 12:53

## 2017-05-16 RX ADMIN — Medication SCH MG: at 12:53

## 2017-05-16 RX ADMIN — INSULIN ASPART SCH UNITS: 100 INJECTION, SOLUTION INTRAVENOUS; SUBCUTANEOUS at 07:30

## 2017-05-16 RX ADMIN — MORPHINE SULFATE SCH MG: 15 TABLET, EXTENDED RELEASE ORAL at 22:38

## 2017-05-16 RX ADMIN — PREGABALIN SCH MG: 75 CAPSULE ORAL at 22:37

## 2017-05-16 RX ADMIN — OXYCODONE HYDROCHLORIDE AND ACETAMINOPHEN SCH MG: 500 TABLET ORAL at 08:45

## 2017-05-16 RX ADMIN — ASPIRIN 81 MG SCH MG: 81 TABLET ORAL at 08:45

## 2017-05-16 RX ADMIN — INSULIN ASPART SCH UNITS: 100 INJECTION, SOLUTION INTRAVENOUS; SUBCUTANEOUS at 12:13

## 2017-05-16 RX ADMIN — ATORVASTATIN CALCIUM SCH MG: 40 TABLET, FILM COATED ORAL at 22:40

## 2017-05-16 RX ADMIN — ESCITALOPRAM OXALATE SCH MG: 10 TABLET ORAL at 22:39

## 2017-05-16 RX ADMIN — ATENOLOL SCH MG: 50 TABLET ORAL at 08:52

## 2017-05-16 RX ADMIN — INSULIN ASPART SCH UNITS: 100 INJECTION, SOLUTION INTRAVENOUS; SUBCUTANEOUS at 12:11

## 2017-05-16 RX ADMIN — Medication SCH MG: at 17:30

## 2017-05-16 RX ADMIN — CARISOPRODOL SCH MG: 350 TABLET ORAL at 08:52

## 2017-05-16 RX ADMIN — INSULIN ASPART SCH UNITS: 100 INJECTION, SOLUTION INTRAVENOUS; SUBCUTANEOUS at 08:51

## 2017-05-16 RX ADMIN — INSULIN DETEMIR SCH UNITS: 100 INJECTION, SOLUTION SUBCUTANEOUS at 21:00

## 2017-05-16 RX ADMIN — ATENOLOL SCH MG: 50 TABLET ORAL at 22:41

## 2017-05-16 RX ADMIN — Medication SCH MG: at 22:39

## 2017-05-16 RX ADMIN — INSULIN ASPART SCH UNITS: 100 INJECTION, SOLUTION INTRAVENOUS; SUBCUTANEOUS at 17:32

## 2017-05-16 RX ADMIN — PREGABALIN SCH MG: 75 CAPSULE ORAL at 08:49

## 2017-05-16 RX ADMIN — LOSARTAN POTASSIUM SCH MG: 50 TABLET ORAL at 08:51

## 2017-05-16 NOTE — PDOC
CARDIO Progress Notes


Date and Time


Date of Service


05/16/2017


Time of Evaluation


1018





Subjective


Subjective:  No Chest Pain, No Palpitations, No Dizziness, Other (drowsy & 

lethargic)





Vitals


Vitals





Vital Signs








  Date Time  Temp Pulse Resp B/P (MAP) Pulse Ox O2 Delivery O2 Flow Rate FiO2


 


5/16/17 08:45   18   Nasal Cannula  


 


5/16/17 07:00 98.0 55  115/50 (71) 97  2.0 





 98.0       








Weight


Weight [ ]





Input and Output


Intake and Output











Intake and Output 


 


 5/16/17





 07:00


 


Intake Total 340 ml


 


Balance 340 ml


 


 


 


Intake Oral 340 ml


 


# Voids 7


 


# Bowel Movements 17











Laboratory


Labs





Laboratory Tests








Test


  5/15/17


10:50 5/15/17


15:30 5/15/17


16:50 5/16/17


05:15


 


Glucose (Fingerstick)


  225 mg/dL


(70-99) 


  194 mg/dL


(70-99) 


 


 


Potassium Level


  


  3.0 mmol/L


(3.5-5.1) 


  3.7 mmol/L


(3.5-5.1)


 


White Blood Count


  


  


  


  13.5 x10^3/uL


(4.0-11.0)


 


Red Blood Count


  


  


  


  5.35 x10^6/uL


(4.30-5.70)


 


Hemoglobin


  


  


  


  15.0 g/dL


(13.0-17.5)


 


Hematocrit


  


  


  


  45.8 %


(39.0-53.0)


 


Mean Corpuscular Volume    86 fL () 


 


Mean Corpuscular Hemoglobin    28 pg (25-35) 


 


Mean Corpuscular Hemoglobin


Concent 


  


  


  33 g/dL


(31-37)


 


Red Cell Distribution Width


  


  


  


  15.6 %


(11.5-14.5)


 


Platelet Count


  


  


  


  243 x10^3/uL


(140-400)


 


Sodium Level


  


  


  


  140 mmol/L


(136-145)


 


Chloride Level


  


  


  


  106 mmol/L


()


 


Carbon Dioxide Level


  


  


  


  28 mmol/L


(21-32)


 


Anion Gap    6 (6-14) 


 


Blood Urea Nitrogen


  


  


  


  10 mg/dL


(8-26)


 


Creatinine


  


  


  


  0.7 mg/dL


(0.7-1.3)


 


Estimated GFR


(Cockcroft-Gault) 


  


  


  113.5 


 


 


Glucose Level


  


  


  


  113 mg/dL


(70-99)


 


Calcium Level


  


  


  


  7.8 mg/dL


(8.5-10.1)


 


Magnesium Level


  


  


  


  1.9 mg/dL


(1.8-2.4)


 


Test


  5/16/17


08:16 


  


  


 


 


Glucose (Fingerstick)


  121 mg/dL


(70-99) 


  


  


 











Microbiology


Micro





Microbiology


5/13/17 Blood Culture - Preliminary, Resulted


          NO GROWTH AFTER 2 DAYS


5/13/17 Urine Culture - Final, Complete


          


5/13/17 Urine Culture Result 1 ([facility]) - Final, Complete


          


5/13/17 Antimicrobic Susceptibility - Final, Complete





Physical Exam


HEENT:  Neck Supple W Full Motion


Chest:  Symmetric, Other (reproducible CP in lower 1/3 of sternum)


LUNGS:  Other (decreased in bases anteriorly)


Heart:  S1S2, RRR, no murmurs


Abdomen:  Soft N/T


Extremities:  Other (right AKA amputation; 2+ LLE edema)


Neurology:  other (lethargic)





Assessment


Assessment


1. Elevated troponin in the setting of sepsis


   troponin peaked at 0.1 - echo with preserved LV function @ 60-65% and no 

segmental wall motion abnormalities


   associated with encephalopathy   


   continue to await MPI from 


   





2. HTN


   improved control today


   ? atenolol held





3. PAD


   previous right AKA





4. CAD s/p prior remote PCI


   requesting records from 





Plan


Plan


agreeable with transfer back to [facility]











NIGEL SHAW May 16, 2017 10:18

## 2017-05-16 NOTE — PDOC
PROGRESS NOTES


Chief Complaint


Chief Complaint


 


1.  Toxic encephalopathy from infection


2.  UTI:  GNR  ceftriaxone


3.  C.diff colitis vanco PO


4.  DM2


5.  HTN:  


6.  PAD:  hx AKA.   w/ weakness and debility


7.  Chronic back pain:  cont home pain regimen


8.  Prophylaxis: lovenox





History of Present Illness


History of Present Illness


feels poorly today.  generalized malaise, myalgias.  denies abd pain.  + 

diarrhea





Vitals


Vitals





Vital Signs








  Date Time  Temp Pulse Resp B/P (MAP) Pulse Ox O2 Delivery O2 Flow Rate FiO2


 


5/16/17 12:45   16  91 Nasal Cannula 2.0 


 


5/16/17 11:00 98.1 55  105/57 (73)    





 98.1       











Physical Exam


General:  Alert, Cooperative, No acute distress


Heart:  Regular rate, No murmurs


Lungs:  Clear


Abdomen:  Normal bowel sounds, Soft, No tenderness, Other (decreased bowel 

sounds)


Extremities:  No clubbing, Other (trace edema L LE.  AKA R)


Skin:  No rashes





Labs


LABS





Laboratory Tests








Test


  5/15/17


15:30 5/15/17


16:50 5/16/17


05:15 5/16/17


08:16


 


Potassium Level


  3.0 mmol/L


(3.5-5.1) 


  3.7 mmol/L


(3.5-5.1) 


 


 


Glucose (Fingerstick)


  


  194 mg/dL


(70-99) 


  121 mg/dL


(70-99)


 


White Blood Count


  


  


  13.5 x10^3/uL


(4.0-11.0) 


 


 


Red Blood Count


  


  


  5.35 x10^6/uL


(4.30-5.70) 


 


 


Hemoglobin


  


  


  15.0 g/dL


(13.0-17.5) 


 


 


Hematocrit


  


  


  45.8 %


(39.0-53.0) 


 


 


Mean Corpuscular Volume   86 fL ()  


 


Mean Corpuscular Hemoglobin   28 pg (25-35)  


 


Mean Corpuscular Hemoglobin


Concent 


  


  33 g/dL


(31-37) 


 


 


Red Cell Distribution Width


  


  


  15.6 %


(11.5-14.5) 


 


 


Platelet Count


  


  


  243 x10^3/uL


(140-400) 


 


 


Sodium Level


  


  


  140 mmol/L


(136-145) 


 


 


Chloride Level


  


  


  106 mmol/L


() 


 


 


Carbon Dioxide Level


  


  


  28 mmol/L


(21-32) 


 


 


Anion Gap   6 (6-14)  


 


Blood Urea Nitrogen


  


  


  10 mg/dL


(8-26) 


 


 


Creatinine


  


  


  0.7 mg/dL


(0.7-1.3) 


 


 


Estimated GFR


(Cockcroft-Gault) 


  


  113.5 


  


 


 


Glucose Level


  


  


  113 mg/dL


(70-99) 


 


 


Calcium Level


  


  


  7.8 mg/dL


(8.5-10.1) 


 


 


Magnesium Level


  


  


  1.9 mg/dL


(1.8-2.4) 


 


 


Test


  5/16/17


12:05 


  


  


 


 


Glucose (Fingerstick)


  143 mg/dL


(70-99) 


  


  


 











Review of Systems


Review of Systems


some nausea, + diarrhea


has been on clear liquids, not eating much





Assessment and Plan


Assessmemt and Plan


Problems


Medical Problems:


(1) Altered mental status


Status: Acute  





(2) Hypotension


Status: Acute  








Problems:  





Comment


Review of Relevant


I have reviewed the following items daria (where applicable) has been applied.


Labs





Laboratory Tests








Test


  5/14/17


14:00 5/14/17


17:20 5/14/17


20:55 5/15/17


05:40


 


Troponin I Quantitative


  0.061 ng/mL


(0.000-0.055) 


  


  


 


 


Glucose (Fingerstick)


  


  206 mg/dL


(70-99) 225 mg/dL


(70-99) 


 


 


White Blood Count


  


  


  


  14.0 x10^3/uL


(4.0-11.0)


 


Red Blood Count


  


  


  


  6.24 x10^6/uL


(4.30-5.70)


 


Hemoglobin


  


  


  


  17.7 g/dL


(13.0-17.5)


 


Hematocrit


  


  


  


  52.6 %


(39.0-53.0)


 


Mean Corpuscular Volume    84 fL () 


 


Mean Corpuscular Hemoglobin    28 pg (25-35) 


 


Mean Corpuscular Hemoglobin


Concent 


  


  


  34 g/dL


(31-37)


 


Red Cell Distribution Width


  


  


  


  15.1 %


(11.5-14.5)


 


Platelet Count


  


  


  


  264 x10^3/uL


(140-400)


 


Sodium Level


  


  


  


  145 mmol/L


(136-145)


 


Potassium Level


  


  


  


  2.8 mmol/L


(3.5-5.1)


 


Chloride Level


  


  


  


  105 mmol/L


()


 


Carbon Dioxide Level


  


  


  


  28 mmol/L


(21-32)


 


Anion Gap    12 (6-14) 


 


Blood Urea Nitrogen


  


  


  


  10 mg/dL


(8-26)


 


Creatinine


  


  


  


  0.8 mg/dL


(0.7-1.3)


 


Estimated GFR


(Cockcroft-Gault) 


  


  


  97.3 


 


 


Glucose Level


  


  


  


  219 mg/dL


(70-99)


 


Calcium Level


  


  


  


  8.9 mg/dL


(8.5-10.1)


 


Magnesium Level


  


  


  


  1.5 mg/dL


(1.8-2.4)


 


Test


  5/15/17


07:21 5/15/17


10:50 5/15/17


15:30 5/15/17


16:50


 


Glucose (Fingerstick)


  214 mg/dL


(70-99) 225 mg/dL


(70-99) 


  194 mg/dL


(70-99)


 


Potassium Level


  


  


  3.0 mmol/L


(3.5-5.1) 


 


 


Test


  5/16/17


05:15 5/16/17


08:16 5/16/17


12:05 


 


 


White Blood Count


  13.5 x10^3/uL


(4.0-11.0) 


  


  


 


 


Red Blood Count


  5.35 x10^6/uL


(4.30-5.70) 


  


  


 


 


Hemoglobin


  15.0 g/dL


(13.0-17.5) 


  


  


 


 


Hematocrit


  45.8 %


(39.0-53.0) 


  


  


 


 


Mean Corpuscular Volume 86 fL ()    


 


Mean Corpuscular Hemoglobin 28 pg (25-35)    


 


Mean Corpuscular Hemoglobin


Concent 33 g/dL


(31-37) 


  


  


 


 


Red Cell Distribution Width


  15.6 %


(11.5-14.5) 


  


  


 


 


Platelet Count


  243 x10^3/uL


(140-400) 


  


  


 


 


Sodium Level


  140 mmol/L


(136-145) 


  


  


 


 


Potassium Level


  3.7 mmol/L


(3.5-5.1) 


  


  


 


 


Chloride Level


  106 mmol/L


() 


  


  


 


 


Carbon Dioxide Level


  28 mmol/L


(21-32) 


  


  


 


 


Anion Gap 6 (6-14)    


 


Blood Urea Nitrogen


  10 mg/dL


(8-26) 


  


  


 


 


Creatinine


  0.7 mg/dL


(0.7-1.3) 


  


  


 


 


Estimated GFR


(Cockcroft-Gault) 113.5 


  


  


  


 


 


Glucose Level


  113 mg/dL


(70-99) 


  


  


 


 


Calcium Level


  7.8 mg/dL


(8.5-10.1) 


  


  


 


 


Magnesium Level


  1.9 mg/dL


(1.8-2.4) 


  


  


 


 


Glucose (Fingerstick)


  


  121 mg/dL


(70-99) 143 mg/dL


(70-99) 


 








Laboratory Tests








Test


  5/15/17


15:30 5/15/17


16:50 5/16/17


05:15 5/16/17


08:16


 


Potassium Level


  3.0 mmol/L


(3.5-5.1) 


  3.7 mmol/L


(3.5-5.1) 


 


 


Glucose (Fingerstick)


  


  194 mg/dL


(70-99) 


  121 mg/dL


(70-99)


 


White Blood Count


  


  


  13.5 x10^3/uL


(4.0-11.0) 


 


 


Red Blood Count


  


  


  5.35 x10^6/uL


(4.30-5.70) 


 


 


Hemoglobin


  


  


  15.0 g/dL


(13.0-17.5) 


 


 


Hematocrit


  


  


  45.8 %


(39.0-53.0) 


 


 


Mean Corpuscular Volume   86 fL ()  


 


Mean Corpuscular Hemoglobin   28 pg (25-35)  


 


Mean Corpuscular Hemoglobin


Concent 


  


  33 g/dL


(31-37) 


 


 


Red Cell Distribution Width


  


  


  15.6 %


(11.5-14.5) 


 


 


Platelet Count


  


  


  243 x10^3/uL


(140-400) 


 


 


Sodium Level


  


  


  140 mmol/L


(136-145) 


 


 


Chloride Level


  


  


  106 mmol/L


() 


 


 


Carbon Dioxide Level


  


  


  28 mmol/L


(21-32) 


 


 


Anion Gap   6 (6-14)  


 


Blood Urea Nitrogen


  


  


  10 mg/dL


(8-26) 


 


 


Creatinine


  


  


  0.7 mg/dL


(0.7-1.3) 


 


 


Estimated GFR


(Cockcroft-Gault) 


  


  113.5 


  


 


 


Glucose Level


  


  


  113 mg/dL


(70-99) 


 


 


Calcium Level


  


  


  7.8 mg/dL


(8.5-10.1) 


 


 


Magnesium Level


  


  


  1.9 mg/dL


(1.8-2.4) 


 


 


Test


  5/16/17


12:05 


  


  


 


 


Glucose (Fingerstick)


  143 mg/dL


(70-99) 


  


  


 








Microbiology


5/13/17 Blood Culture - Preliminary, Resulted


          NO GROWTH AFTER 2 DAYS


5/13/17 Urine Culture - Final, Complete


          


5/13/17 Urine Culture Result 1 ([facility]) - Final, Complete


          


5/13/17 Antimicrobic Susceptibility - Final, Complete


Medications





Current Medications


Ondansetron HCl (Zofran) 4 mg PRN Q8HRS  PRN IV NAUSEA/VOMITING Last 

administered on 5/14/17at 08:39;  Start 5/13/17 at 15:15;  Stop 5/14/17 at 15:21

;  Status DC


Morphine Sulfate 2 mg PRN Q2HR  PRN IV PAIN;  Start 5/13/17 at 15:15;  Stop 5/14 /17 at 15:21;  Status DC


Sodium Chloride 1,000 ml @  125 mls/hr Q8H IV  Last administered on 5/14/17at 07

:31;  Start 5/13/17 at 15:09;  Stop 5/14/17 at 15:21;  Status DC


Acetaminophen (Tylenol) 650 mg PRN Q4HRS  PRN PO FEVER;  Start 5/13/17 at 15:15

;  Stop 5/14/17 at 15:21;  Status DC


Naloxone HCl (Narcan) 0.4 mg 1X  ONCE IV  Last administered on 5/13/17at 15:35;

  Start 5/13/17 at 15:15;  Stop 5/13/17 at 15:24;  Status DC


Sodium Chloride 1,000 ml @  1,000 mls/hr 1X  ONCE IV  Last administered on 5/13/ 17at 14:00;  Start 5/13/17 at 15:15;  Stop 5/13/17 at 16:14;  Status DC


Ceftriaxone Sodium 50 ml @  100 mls/hr 1X  ONCE IV  Last administered on 5/13/ 17at 15:59;  Start 5/13/17 at 15:15;  Stop 5/13/17 at 15:44;  Status DC


Azithromycin 500 mg/Sodium Chloride 250 ml @  250 mls/hr 1X  ONCE IV ;  Start 5/ 13/17 at 15:15;  Stop 5/13/17 at 16:14;  Status UNV


Azithromycin 250 ml @  250 mls/hr 1X  ONCE IV  Last administered on 5/13/17at 16

:44;  Start 5/13/17 at 15:30;  Stop 5/13/17 at 16:29;  Status DC


Aspirin (Bj Aspirin) 325 mg 1X  ONCE PO  Last administered on 5/13/17at 16:44

;  Start 5/13/17 at 16:45;  Stop 5/13/17 at 16:46;  Status DC


Ceftriaxone Sodium 1 gm/ Sodium Chloride 50 ml @  100 mls/hr Q24H IV  Last 

administered on 5/16/17at 08:54;  Start 5/14/17 at 09:00


Hydralazine HCl (Apresoline) 10 mg PRN Q4HRS  PRN IVP ELEVATED BP, SEE COMMENTS 

Last administered on 5/15/17at 03:26;  Start 5/14/17 at 09:00


Amlodipine Besylate (Norvasc) 10 mg DAILY PO  Last administered on 5/15/17at 14:

22;  Start 5/14/17 at 09:00


Atenolol (Tenormin) 25 mg BID PO ;  Start 5/14/17 at 09:00;  Stop 5/14/17 at 15:

43;  Status DC


Atorvastatin Calcium (Lipitor) 40 mg QHS PO ;  Start 5/14/17 at 21:00;  Stop 5/ 14/17 at 21:00;  Status DC


Losartan Potassium (Cozaar) 50 mg DAILY PO ;  Start 5/14/17 at 09:00;  Stop 5/14 /17 at 15:43;  Status DC


Metoclopramide HCl (Reglan) 10 mg PRN Q8HRS  PRN IV NAUSEA/VOMITING Last 

administered on 5/15/17at 14:23;  Start 5/14/17 at 10:00


Vancomycin HCl 250 mg [id number] PO  Last administered on 5/16/17at 12:53;  Start 5 /14/17 at 17:00


Acetaminophen (Tylenol) 650 mg PRN Q6HRS  PRN PO PAIN;  Start 5/14/17 at 15:00


Amlodipine Besylate (Norvasc) 10 mg DAILY PO ;  Start 5/15/17 at 09:00;  Status 

UNV


Ascorbic Acid (Vitamin C) 500 mg DAILY PO  Last administered on 5/16/17at 08:45

;  Start 5/14/17 at 16:00


Aspirin (Children'S Aspirin) 81 mg DAILY PO  Last administered on 5/16/17at 08:

45;  Start 5/14/17 at 16:00


Atenolol (Tenormin) 50 mg BID PO  Last administered on 5/15/17at 21:00;  Start 5 /14/17 at 21:00


Carisoprodol (Soma) 350 mg PRN Q8HRS  PRN PO PAIN;  Start 5/14/17 at 15:00


Carisoprodol (Soma) 350 mg DAILY PO  Last administered on 5/16/17at 08:52;  

Start 5/15/17 at 09:00


Escitalopram Oxalate (Lexapro) 10 mg HS PO  Last administered on 5/15/17at 20:49

;  Start 5/14/17 at 21:00


Furosemide (Lasix) 40 mg DAILY PO  Last administered on 5/16/17at 08:51;  Start 

5/14/17 at 16:00


Losartan Potassium (Cozaar) 100 mg DAILY PO  Last administered on 5/15/17at 13:

56;  Start 5/14/17 at 16:00


Morphine Sulfate (Ms Contin) 45 mg BID PO  Last administered on 5/16/17at 08:45

;  Start 5/14/17 at 21:00


Nitroglycerin (Nitrostat) 0.4 mg PRN Q5MIN  PRN SL CHEST PAIN;  Start 5/14/17 

at 15:00


Pregabalin (Lyrica) 75 mg TID PO  Last administered on 5/16/17at 08:49;  Start 5 /14/17 at 15:00


Insulin Detemir (Levemir) 60 units QHS SQ  Last administered on 5/15/17at 21:03

;  Start 5/14/17 at 21:00


Insulin Aspart (NovoLOG) 6 units TIDAC SQ  Last administered on 5/16/17at 12:11

;  Start 5/14/17 at 16:30


Insulin Aspart (NovoLOG)  TIDAC SQ  Last administered on 5/16/17at 12:13;  

Start 5/14/17 at 16:30


Oxycodone HCl (Roxicodone) 15 mg PRN Q6HRS  PRN PO PAIN;  Start 5/14/17 at 15:30


Atorvastatin Calcium (Lipitor) 80 mg QHS PO  Last administered on 5/15/17at 20:

49;  Start 5/14/17 at 21:00


Non-Formulary Medication 2.5 gm QODAY TP ;  Start 5/16/17 at 09:00;  Status UNV


Ondansetron HCl (Zofran) 8 mg PRN Q8HRS  PRN IV NAUSEA/VOMITING Last 

administered on 5/15/17at 05:38;  Start 5/14/17 at 15:30


Prochlorperazine Edisylate (Compazine) 10 mg PRN Q8HRS  PRN IV NAUSEA/VOMITING 

Last administered on 5/15/17at 01:43;  Start 5/14/17 at 15:30


Sodium Chloride 1,000 ml @  75 mls/hr [id number] IV  Last administered on 5/16/ 17at 12:53;  Start 5/14/17 at 18:30


Labetalol HCl (Normodyne) 20 mg PRN Q6HRS  PRN IVP HYPERTENSION, SEE COMMENTS 

Last administered on 5/15/17at 05:39;  Start 5/14/17 at 20:15


Potassium Chloride (Klor-Con) 40 meq 1X  ONCE PO ;  Start 5/15/17 at 07:00;  

Stop 5/15/17 at 07:00;  Status DC


Potassium Chloride 100 ml @  100 mls/hr Q1H IV  Last administered on 5/15/17at 

12:10;  Start 5/15/17 at 07:00;  Stop 5/15/17 at 10:59;  Status DC


Magnesium Sulfate/ Dextrose 50 ml @ 25 mls/hr 1X  ONCE IV  Last administered on 

5/15/17at 18:30;  Start 5/15/17 at 13:00;  Stop 5/15/17 at 14:59;  Status DC


Potassium Chloride (Klor-Con) 40 meq 1X  ONCE PO ;  Start 5/15/17 at 13:45;  

Stop 5/15/17 at 13:46;  Status DC


Potassium Chloride (Klor-Con) 40 meq 1X  ONCE PO  Last administered on 5/15/

17at 19:00;  Start 5/15/17 at 18:30;  Stop 5/15/17 at 18:31;  Status DC





Active Scripts


Active


Amoxicillin 875 Mg Tablet 1 Tab PO BID


Reported


Zinc Oxide 30 Gm Oint...g. 30 Gm TP BID


     karlos inner buttocks


Vitamin D3 (Cholecalciferol (Vitamin D3)) 5,000 Unit Tablet 1 Tab PO DAILY


Stress B With Zinc Tablet (Multivits,Stress Formula/Zinc) 1 Each Tablet 1 Each 

PO DAILY


Soma (Carisoprodol) 350 Mg Tablet 1 Tab PO DAILY


Potassium Chloride 10 Meq Capsule.er 10 Meq PO DAILY


Oxycodone Hcl 15 Mg Tablet 1 Tab PO Q6HRS PRN


Norvasc (Amlodipine Besylate) 10 Mg Tablet 10 Mg PO DAILY


Nitrostat (Nitroglycerin) 0.4 Mg Tab.subl 0.4 Mg SL PRN Q5MIN PRN


Ms Contin (Morphine Sulfate) 30 Mg Tablet.er 15 Tab PO BID


Magnesium (Magnesium Oxide) 400 Mg Capsule 400 Mg PO DAILY


Lyrica (Pregabalin) 75 Mg Capsule 75 Mg PO TID


Lexapro (Escitalopram Oxalate) 5 Mg Tablet 1 Tab PO HS


Escitalopram Oxalate 10 Mg Tablet 1 Tab PO HS


Lasix (Furosemide) 40 Mg Tablet 1 Tab PO DAILY


Lantus (Insulin Glargine,Hum.rec.anlog) 100 Unit/1 Ml Vial 60 Unit SQ DAILY


Imodium A-D (Loperamide HCl) 2 Mg Capsule 2 Mg PO PRN PRN


     after each loose stool up to 16mg in 24 hours


Humalog (Insulin Lispro) 100 Unit/1 Ml Insuln.pen 0-8 Unit SQ TIDAC


     if fs bs


     150-200= 1 unit


     201-250= 2 units


     250-300= 4 units


     301-350= 6 units


     351-400= 8 units


     >401 call 


Humalog (Insulin Lispro) 100 Unit/1 Ml Cartridge 6 Unit SQ TIDAC


Baclofen 10 Mg Tablet 1 Tab PO BID


Ascorbic Acid 500 Mg Tablet 500 Mg PO DAILY


Androgel (Testosterone) 5 Gm Gel.packet 2.5 Gm TP QODAY


Tylenol (Acetaminophen) 325 Mg Tablet 650 Mg PO Q6HRS PRN


Carisoprodol 350 Mg Tablet 350 Mg PO Q8HRS PRN


Temazepam 30 Mg Capsule 1 Cap PO QHS


Crestor (Rosuvastatin Calcium) 40 Mg Tablet 20 Mg PO QHS


Losartan Potassium 50 Mg Tablet 100 Mg PO DAILY


Atenolol 50 Mg Tablet 1 Tab PO BID


Metformin Hcl 500 Mg Tablet 1 Tab PO BID


Aspirin 81 Mg Tab.chew 81 Mg PO DAILY


Sennosides-Docusate Sodium Tab (Sennosides/Docusate Sodium) 1 Each Tablet 2 

Each PO BID


Miralax (Polyethylene Glycol 3350) 17 Gm Powd.pack 17 Gm PO PRN PRN


Vitals/I & O





Vital Sign - Last 24 Hours








 5/15/17 5/15/17 5/15/17 5/15/17





 13:56 14:22 14:25 14:28


 


Pulse 98 90  90


 


Resp   20 


 


B/P (MAP) 160/88 160/88  160/88


 


Pulse Ox   92 


 


O2 Delivery   Nasal Cannula 


 


O2 Flow Rate   2.0 





 5/15/17 5/15/17 5/15/17 5/15/17





 15:00 19:51 20:00 20:51


 


Temp 98.6 99.1  





 98.6 99.1  


 


Pulse 94 84  


 


Resp 18 20  20


 


B/P (MAP) 173/86 (115) 124/84 (97)  


 


Pulse Ox 93 93  93


 


O2 Delivery Nasal Cannula Room Air Nasal Cannula Nasal Cannula


 


O2 Flow Rate 2.0 2.0 2.0 2.0


 


    





    





 5/15/17 5/15/17 5/16/17 5/16/17





 21:00 23:20 03:00 07:00


 


Temp  98.0  98.0





  98.0  98.0


 


Pulse 76 70 54 55


 


Resp  20  18


 


B/P (MAP) 121/69 98/54 (69)  115/50 (71)


 


Pulse Ox  92  97


 


O2 Delivery  Nasal Cannula  Nasal Cannula


 


O2 Flow Rate  2.0  2.0


 


    





    





 5/16/17 5/16/17 5/16/17 5/16/17





 08:00 08:45 11:00 12:45


 


Temp   98.1 





   98.1 


 


Pulse   55 


 


Resp  18 16 16


 


B/P (MAP)   105/57 (73) 


 


Pulse Ox   91 91


 


O2 Delivery Nasal Cannula Nasal Cannula Nasal Cannula Nasal Cannula


 


O2 Flow Rate 2.0  2.0 2.0














Intake and Output   


 


 5/15/17 5/15/17 5/16/17





 15:00 23:00 07:00


 


Intake Total  220 ml 120 ml


 


Balance  220 ml 120 ml

















MOY LÓPEZ MD May 16, 2017 13:32

## 2017-05-17 VITALS — SYSTOLIC BLOOD PRESSURE: 142 MMHG | DIASTOLIC BLOOD PRESSURE: 72 MMHG

## 2017-05-17 VITALS — SYSTOLIC BLOOD PRESSURE: 95 MMHG | DIASTOLIC BLOOD PRESSURE: 48 MMHG

## 2017-05-17 VITALS — DIASTOLIC BLOOD PRESSURE: 67 MMHG | SYSTOLIC BLOOD PRESSURE: 141 MMHG

## 2017-05-17 VITALS — SYSTOLIC BLOOD PRESSURE: 137 MMHG | DIASTOLIC BLOOD PRESSURE: 69 MMHG

## 2017-05-17 LAB
ANION GAP SERPL CALC-SCNC: 7 MMOL/L (ref 6–14)
BUN SERPL-MCNC: 8 MG/DL (ref 8–26)
CALCIUM SERPL-MCNC: 7.8 MG/DL (ref 8.5–10.1)
CHLORIDE SERPL-SCNC: 107 MMOL/L (ref 98–107)
CO2 SERPL-SCNC: 26 MMOL/L (ref 21–32)
CREAT SERPL-MCNC: 0.7 MG/DL (ref 0.7–1.3)
ERYTHROCYTE [DISTWIDTH] IN BLOOD BY AUTOMATED COUNT: 15.7 % (ref 11.5–14.5)
GFR SERPLBLD BASED ON 1.73 SQ M-ARVRAT: 113.5 ML/MIN
GLUCOSE SERPL-MCNC: 181 MG/DL (ref 70–99)
HCT VFR BLD CALC: 46.3 % (ref 39–53)
HGB BLD-MCNC: 15.3 G/DL (ref 13–17.5)
MAGNESIUM SERPL-MCNC: 1.6 MG/DL (ref 1.8–2.4)
MCH RBC QN AUTO: 29 PG (ref 25–35)
MCHC RBC AUTO-ENTMCNC: 33 G/DL (ref 31–37)
MCV RBC AUTO: 87 FL (ref 79–100)
PLATELET # BLD AUTO: 174 X10^3/UL (ref 140–400)
POTASSIUM SERPL-SCNC: 4 MMOL/L (ref 3.5–5.1)
RBC # BLD AUTO: 5.33 X10^6/UL (ref 4.3–5.7)
SODIUM SERPL-SCNC: 140 MMOL/L (ref 136–145)
WBC # BLD AUTO: 9.7 X10^3/UL (ref 4–11)

## 2017-05-17 RX ADMIN — ASPIRIN 81 MG SCH MG: 81 TABLET ORAL at 09:26

## 2017-05-17 RX ADMIN — PREGABALIN SCH MG: 75 CAPSULE ORAL at 14:08

## 2017-05-17 RX ADMIN — Medication SCH MG: at 13:09

## 2017-05-17 RX ADMIN — INSULIN ASPART SCH UNITS: 100 INJECTION, SOLUTION INTRAVENOUS; SUBCUTANEOUS at 13:08

## 2017-05-17 RX ADMIN — BACITRACIN SCH MLS/HR: 5000 INJECTION, POWDER, FOR SOLUTION INTRAMUSCULAR at 02:18

## 2017-05-17 RX ADMIN — ATENOLOL SCH MG: 50 TABLET ORAL at 09:29

## 2017-05-17 RX ADMIN — OXYCODONE HYDROCHLORIDE AND ACETAMINOPHEN SCH MG: 500 TABLET ORAL at 09:28

## 2017-05-17 RX ADMIN — PREGABALIN SCH MG: 75 CAPSULE ORAL at 09:26

## 2017-05-17 RX ADMIN — INSULIN ASPART SCH UNITS: 100 INJECTION, SOLUTION INTRAVENOUS; SUBCUTANEOUS at 09:45

## 2017-05-17 RX ADMIN — LOSARTAN POTASSIUM SCH MG: 50 TABLET ORAL at 09:27

## 2017-05-17 RX ADMIN — INSULIN ASPART SCH UNITS: 100 INJECTION, SOLUTION INTRAVENOUS; SUBCUTANEOUS at 13:07

## 2017-05-17 RX ADMIN — CARISOPRODOL SCH MG: 350 TABLET ORAL at 09:28

## 2017-05-17 RX ADMIN — Medication SCH MG: at 09:35

## 2017-05-17 RX ADMIN — INSULIN ASPART SCH UNITS: 100 INJECTION, SOLUTION INTRAVENOUS; SUBCUTANEOUS at 09:46

## 2017-05-17 RX ADMIN — FUROSEMIDE SCH MG: 40 TABLET ORAL at 09:00

## 2017-05-17 RX ADMIN — BACITRACIN SCH MLS/HR: 5000 INJECTION, POWDER, FOR SOLUTION INTRAMUSCULAR at 13:10

## 2017-05-17 RX ADMIN — MORPHINE SULFATE SCH MG: 15 TABLET, EXTENDED RELEASE ORAL at 09:26

## 2017-05-17 NOTE — PDOC
PROGRESS NOTES


Chief Complaint


Chief Complaint


 


1.  Toxic encephalopathy from infection


2.  UTI:  GNR  ceftriaxone


3.  C.diff colitis vanco PO


4.  DM2


5.  HTN:  


6.  PAD:  hx AKA.   w/ weakness and debility


7.  Chronic back pain:  cont home pain regimen


8.  Prophylaxis: lovenox





History of Present Illness


History of Present Illness


feels poorly today.  generalized malaise, myalgias.  denies abd pain.  + 

diarrhea





Vitals


Vitals





Vital Signs








  Date Time  Temp Pulse Resp B/P (MAP) Pulse Ox O2 Delivery O2 Flow Rate FiO2


 


5/17/17 10:35 99.0 58 20 137/69 (91) 92 Nasal Cannula 2.0 





 99.0       











Physical Exam


General:  Alert, Cooperative, No acute distress


Heart:  Regular rate, No murmurs


Lungs:  Clear


Abdomen:  Normal bowel sounds, Soft, No tenderness, Other (decreased bowel 

sounds)


Extremities:  No clubbing, Other (trace edema L LE.  AKA R)


Skin:  No rashes





Labs


LABS





Laboratory Tests








Test


  5/16/17


16:03 5/17/17


07:35 5/17/17


09:20 5/17/17


11:24


 


Glucose (Fingerstick)


  105 mg/dL


(70-99) 124 mg/dL


(70-99) 


  154 mg/dL


(70-99)


 


White Blood Count


  


  


  9.7 x10^3/uL


(4.0-11.0) 


 


 


Red Blood Count


  


  


  5.33 x10^6/uL


(4.30-5.70) 


 


 


Hemoglobin


  


  


  15.3 g/dL


(13.0-17.5) 


 


 


Hematocrit


  


  


  46.3 %


(39.0-53.0) 


 


 


Mean Corpuscular Volume   87 fL ()  


 


Mean Corpuscular Hemoglobin   29 pg (25-35)  


 


Mean Corpuscular Hemoglobin


Concent 


  


  33 g/dL


(31-37) 


 


 


Red Cell Distribution Width


  


  


  15.7 %


(11.5-14.5) 


 


 


Platelet Count


  


  


  174 x10^3/uL


(140-400) 


 


 


Sodium Level


  


  


  140 mmol/L


(136-145) 


 


 


Potassium Level


  


  


  4.0 mmol/L


(3.5-5.1) 


 


 


Chloride Level


  


  


  107 mmol/L


() 


 


 


Carbon Dioxide Level


  


  


  26 mmol/L


(21-32) 


 


 


Anion Gap   7 (6-14)  


 


Blood Urea Nitrogen   8 mg/dL (8-26)  


 


Creatinine


  


  


  0.7 mg/dL


(0.7-1.3) 


 


 


Estimated GFR


(Cockcroft-Gault) 


  


  113.5 


  


 


 


Glucose Level


  


  


  181 mg/dL


(70-99) 


 


 


Calcium Level


  


  


  7.8 mg/dL


(8.5-10.1) 


 


 


Magnesium Level


  


  


  1.6 mg/dL


(1.8-2.4) 


 











Assessment and Plan


Assessmemt and Plan


Problems


Medical Problems:


(1) Altered mental status


Status: Acute  





(2) Hypotension


Status: Acute  








Problems:  





Comment


Review of Relevant


I have reviewed the following items daria (where applicable) has been applied.


Labs





Laboratory Tests








Test


  5/15/17


15:30 5/15/17


16:50 5/15/17


20:58 5/16/17


01:35


 


Potassium Level


  3.0 mmol/L


(3.5-5.1) 


  


  


 


 


Glucose (Fingerstick)


  


  194 mg/dL


(70-99) 194 mg/dL


(70-99) 115 mg/dL


(70-99)


 


Test


  5/16/17


05:15 5/16/17


08:16 5/16/17


12:05 5/16/17


16:03


 


White Blood Count


  13.5 x10^3/uL


(4.0-11.0) 


  


  


 


 


Red Blood Count


  5.35 x10^6/uL


(4.30-5.70) 


  


  


 


 


Hemoglobin


  15.0 g/dL


(13.0-17.5) 


  


  


 


 


Hematocrit


  45.8 %


(39.0-53.0) 


  


  


 


 


Mean Corpuscular Volume 86 fL ()    


 


Mean Corpuscular Hemoglobin 28 pg (25-35)    


 


Mean Corpuscular Hemoglobin


Concent 33 g/dL


(31-37) 


  


  


 


 


Red Cell Distribution Width


  15.6 %


(11.5-14.5) 


  


  


 


 


Platelet Count


  243 x10^3/uL


(140-400) 


  


  


 


 


Sodium Level


  140 mmol/L


(136-145) 


  


  


 


 


Potassium Level


  3.7 mmol/L


(3.5-5.1) 


  


  


 


 


Chloride Level


  106 mmol/L


() 


  


  


 


 


Carbon Dioxide Level


  28 mmol/L


(21-32) 


  


  


 


 


Anion Gap 6 (6-14)    


 


Blood Urea Nitrogen


  10 mg/dL


(8-26) 


  


  


 


 


Creatinine


  0.7 mg/dL


(0.7-1.3) 


  


  


 


 


Estimated GFR


(Cockcroft-Gault) 113.5 


  


  


  


 


 


Glucose Level


  113 mg/dL


(70-99) 


  


  


 


 


Calcium Level


  7.8 mg/dL


(8.5-10.1) 


  


  


 


 


Magnesium Level


  1.9 mg/dL


(1.8-2.4) 


  


  


 


 


Glucose (Fingerstick)


  


  121 mg/dL


(70-99) 143 mg/dL


(70-99) 105 mg/dL


(70-99)


 


Test


  5/17/17


07:35 5/17/17


09:20 5/17/17


11:24 


 


 


Glucose (Fingerstick)


  124 mg/dL


(70-99) 


  154 mg/dL


(70-99) 


 


 


White Blood Count


  


  9.7 x10^3/uL


(4.0-11.0) 


  


 


 


Red Blood Count


  


  5.33 x10^6/uL


(4.30-5.70) 


  


 


 


Hemoglobin


  


  15.3 g/dL


(13.0-17.5) 


  


 


 


Hematocrit


  


  46.3 %


(39.0-53.0) 


  


 


 


Mean Corpuscular Volume  87 fL ()   


 


Mean Corpuscular Hemoglobin  29 pg (25-35)   


 


Mean Corpuscular Hemoglobin


Concent 


  33 g/dL


(31-37) 


  


 


 


Red Cell Distribution Width


  


  15.7 %


(11.5-14.5) 


  


 


 


Platelet Count


  


  174 x10^3/uL


(140-400) 


  


 


 


Sodium Level


  


  140 mmol/L


(136-145) 


  


 


 


Potassium Level


  


  4.0 mmol/L


(3.5-5.1) 


  


 


 


Chloride Level


  


  107 mmol/L


() 


  


 


 


Carbon Dioxide Level


  


  26 mmol/L


(21-32) 


  


 


 


Anion Gap  7 (6-14)   


 


Blood Urea Nitrogen  8 mg/dL (8-26)   


 


Creatinine


  


  0.7 mg/dL


(0.7-1.3) 


  


 


 


Estimated GFR


(Cockcroft-Gault) 


  113.5 


  


  


 


 


Glucose Level


  


  181 mg/dL


(70-99) 


  


 


 


Calcium Level


  


  7.8 mg/dL


(8.5-10.1) 


  


 


 


Magnesium Level


  


  1.6 mg/dL


(1.8-2.4) 


  


 








Laboratory Tests








Test


  5/16/17


16:03 5/17/17


07:35 5/17/17


09:20 5/17/17


11:24


 


Glucose (Fingerstick)


  105 mg/dL


(70-99) 124 mg/dL


(70-99) 


  154 mg/dL


(70-99)


 


White Blood Count


  


  


  9.7 x10^3/uL


(4.0-11.0) 


 


 


Red Blood Count


  


  


  5.33 x10^6/uL


(4.30-5.70) 


 


 


Hemoglobin


  


  


  15.3 g/dL


(13.0-17.5) 


 


 


Hematocrit


  


  


  46.3 %


(39.0-53.0) 


 


 


Mean Corpuscular Volume   87 fL ()  


 


Mean Corpuscular Hemoglobin   29 pg (25-35)  


 


Mean Corpuscular Hemoglobin


Concent 


  


  33 g/dL


(31-37) 


 


 


Red Cell Distribution Width


  


  


  15.7 %


(11.5-14.5) 


 


 


Platelet Count


  


  


  174 x10^3/uL


(140-400) 


 


 


Sodium Level


  


  


  140 mmol/L


(136-145) 


 


 


Potassium Level


  


  


  4.0 mmol/L


(3.5-5.1) 


 


 


Chloride Level


  


  


  107 mmol/L


() 


 


 


Carbon Dioxide Level


  


  


  26 mmol/L


(21-32) 


 


 


Anion Gap   7 (6-14)  


 


Blood Urea Nitrogen   8 mg/dL (8-26)  


 


Creatinine


  


  


  0.7 mg/dL


(0.7-1.3) 


 


 


Estimated GFR


(Cockcroft-Gault) 


  


  113.5 


  


 


 


Glucose Level


  


  


  181 mg/dL


(70-99) 


 


 


Calcium Level


  


  


  7.8 mg/dL


(8.5-10.1) 


 


 


Magnesium Level


  


  


  1.6 mg/dL


(1.8-2.4) 


 








Microbiology


5/13/17 Blood Culture - Preliminary, Resulted


          NO GROWTH AFTER 3 DAYS


5/13/17 Urine Culture - Final, Complete


          


5/13/17 Urine Culture Result 1 ([facility]) - Final, Complete


          


5/13/17 Antimicrobic Susceptibility - Final, Complete


Medications





Current Medications


Ondansetron HCl (Zofran) 4 mg PRN Q8HRS  PRN IV NAUSEA/VOMITING Last 

administered on 5/14/17at 08:39;  Start 5/13/17 at 15:15;  Stop 5/14/17 at 15:21

;  Status DC


Morphine Sulfate 2 mg PRN Q2HR  PRN IV PAIN;  Start 5/13/17 at 15:15;  Stop 5/14 /17 at 15:21;  Status DC


Sodium Chloride 1,000 ml @  125 mls/hr Q8H IV  Last administered on 5/14/17at 07

:31;  Start 5/13/17 at 15:09;  Stop 5/14/17 at 15:21;  Status DC


Acetaminophen (Tylenol) 650 mg PRN Q4HRS  PRN PO FEVER;  Start 5/13/17 at 15:15

;  Stop 5/14/17 at 15:21;  Status DC


Naloxone HCl (Narcan) 0.4 mg 1X  ONCE IV  Last administered on 5/13/17at 15:35;

  Start 5/13/17 at 15:15;  Stop 5/13/17 at 15:24;  Status DC


Sodium Chloride 1,000 ml @  1,000 mls/hr 1X  ONCE IV  Last administered on 5/13/ 17at 14:00;  Start 5/13/17 at 15:15;  Stop 5/13/17 at 16:14;  Status DC


Ceftriaxone Sodium 50 ml @  100 mls/hr 1X  ONCE IV  Last administered on 5/13/ 17at 15:59;  Start 5/13/17 at 15:15;  Stop 5/13/17 at 15:44;  Status DC


Azithromycin 500 mg/Sodium Chloride 250 ml @  250 mls/hr 1X  ONCE IV ;  Start 5/ 13/17 at 15:15;  Stop 5/13/17 at 16:14;  Status UNV


Azithromycin 250 ml @  250 mls/hr 1X  ONCE IV  Last administered on 5/13/17at 16

:44;  Start 5/13/17 at 15:30;  Stop 5/13/17 at 16:29;  Status DC


Aspirin (Bj Aspirin) 325 mg 1X  ONCE PO  Last administered on 5/13/17at 16:44

;  Start 5/13/17 at 16:45;  Stop 5/13/17 at 16:46;  Status DC


Ceftriaxone Sodium 1 gm/ Sodium Chloride 50 ml @  100 mls/hr Q24H IV  Last 

administered on 5/17/17at 09:35;  Start 5/14/17 at 09:00;  Stop 5/17/17 at 12:51

;  Status DC


Hydralazine HCl (Apresoline) 10 mg PRN Q4HRS  PRN IVP ELEVATED BP, SEE COMMENTS 

Last administered on 5/15/17at 03:26;  Start 5/14/17 at 09:00


Amlodipine Besylate (Norvasc) 10 mg DAILY PO  Last administered on 5/17/17at 09:

29;  Start 5/14/17 at 09:00


Atenolol (Tenormin) 25 mg BID PO ;  Start 5/14/17 at 09:00;  Stop 5/14/17 at 15:

43;  Status DC


Atorvastatin Calcium (Lipitor) 40 mg QHS PO ;  Start 5/14/17 at 21:00;  Stop 5/ 14/17 at 21:00;  Status DC


Losartan Potassium (Cozaar) 50 mg DAILY PO ;  Start 5/14/17 at 09:00;  Stop 5/14 /17 at 15:43;  Status DC


Metoclopramide HCl (Reglan) 10 mg PRN Q8HRS  PRN IV NAUSEA/VOMITING Last 

administered on 5/15/17at 14:23;  Start 5/14/17 at 10:00


Vancomycin HCl 250 mg [id number] PO  Last administered on 5/17/17at 09:35;  Start 5 /14/17 at 17:00


Acetaminophen (Tylenol) 650 mg PRN Q6HRS  PRN PO PAIN;  Start 5/14/17 at 15:00


Amlodipine Besylate (Norvasc) 10 mg DAILY PO ;  Start 5/15/17 at 09:00;  Status 

UNV


Ascorbic Acid (Vitamin C) 500 mg DAILY PO  Last administered on 5/17/17at 09:28

;  Start 5/14/17 at 16:00


Aspirin (Children'S Aspirin) 81 mg DAILY PO  Last administered on 5/17/17at 09:

26;  Start 5/14/17 at 16:00


Atenolol (Tenormin) 50 mg BID PO  Last administered on 5/17/17at 09:29;  Start 5 /14/17 at 21:00


Carisoprodol (Soma) 350 mg PRN Q8HRS  PRN PO PAIN;  Start 5/14/17 at 15:00


Carisoprodol (Soma) 350 mg DAILY PO  Last administered on 5/17/17at 09:28;  

Start 5/15/17 at 09:00


Escitalopram Oxalate (Lexapro) 10 mg HS PO  Last administered on 5/16/17at 22:39

;  Start 5/14/17 at 21:00


Furosemide (Lasix) 40 mg DAILY PO  Last administered on 5/17/17at 09:00;  Start 

5/14/17 at 16:00


Losartan Potassium (Cozaar) 100 mg DAILY PO  Last administered on 5/17/17at 09:

27;  Start 5/14/17 at 16:00


Morphine Sulfate (Ms Contin) 45 mg BID PO  Last administered on 5/17/17at 09:26

;  Start 5/14/17 at 21:00;  Stop 5/17/17 at 11:00;  Status DC


Nitroglycerin (Nitrostat) 0.4 mg PRN Q5MIN  PRN SL CHEST PAIN;  Start 5/14/17 

at 15:00


Pregabalin (Lyrica) 75 mg TID PO  Last administered on 5/17/17at 09:26;  Start 5 /14/17 at 15:00


Insulin Detemir (Levemir) 60 units QHS SQ  Last administered on 5/15/17at 21:03

;  Start 5/14/17 at 21:00


Insulin Aspart (NovoLOG) 6 units TIDAC SQ  Last administered on 5/17/17at 09:45

;  Start 5/14/17 at 16:30


Insulin Aspart (NovoLOG)  TIDAC SQ  Last administered on 5/17/17at 09:46;  

Start 5/14/17 at 16:30


Oxycodone HCl (Roxicodone) 15 mg PRN Q6HRS  PRN PO PAIN Last administered on 5/ 16/17at 19:35;  Start 5/14/17 at 15:30


Atorvastatin Calcium (Lipitor) 80 mg QHS PO  Last administered on 5/16/17at 22:

40;  Start 5/14/17 at 21:00


Non-Formulary Medication 2.5 gm QODAY TP ;  Start 5/16/17 at 09:00;  Status UNV


Ondansetron HCl (Zofran) 8 mg PRN Q8HRS  PRN IV NAUSEA/VOMITING Last 

administered on 5/15/17at 05:38;  Start 5/14/17 at 15:30


Prochlorperazine Edisylate (Compazine) 10 mg PRN Q8HRS  PRN IV NAUSEA/VOMITING 

Last administered on 5/15/17at 01:43;  Start 5/14/17 at 15:30


Sodium Chloride 1,000 ml @  75 mls/hr [id number] IV  Last administered on 5/17/ 17at 02:18;  Start 5/14/17 at 18:30


Labetalol HCl (Normodyne) 20 mg PRN Q6HRS  PRN IVP HYPERTENSION, SEE COMMENTS 

Last administered on 5/15/17at 05:39;  Start 5/14/17 at 20:15


Potassium Chloride (Klor-Con) 40 meq 1X  ONCE PO ;  Start 5/15/17 at 07:00;  

Stop 5/15/17 at 07:00;  Status DC


Potassium Chloride 100 ml @  100 mls/hr Q1H IV  Last administered on 5/15/17at 

12:10;  Start 5/15/17 at 07:00;  Stop 5/15/17 at 10:59;  Status DC


Magnesium Sulfate/ Dextrose 50 ml @ 25 mls/hr 1X  ONCE IV  Last administered on 

5/15/17at 18:30;  Start 5/15/17 at 13:00;  Stop 5/15/17 at 14:59;  Status DC


Potassium Chloride (Klor-Con) 40 meq 1X  ONCE PO ;  Start 5/15/17 at 13:45;  

Stop 5/15/17 at 13:46;  Status DC


Potassium Chloride (Klor-Con) 40 meq 1X  ONCE PO  Last administered on 5/15/

17at 19:00;  Start 5/15/17 at 18:30;  Stop 5/15/17 at 18:31;  Status DC


Polyethylene Glycol (miraLAX PACKET) 17 gm PRN DAILY  PRN PO CONSTIPATION;  

Start 5/17/17 at 10:00


Docusate Sodium (Colace) 100 mg PRN DAILY  PRN PO CONSTIPATION;  Start 5/17/17 

at 10:00


Magnesium Sulfate/ Dextrose 100 ml @  25 mls/hr 1X  ONCE IV  Last administered 

on 5/17/17at 11:19;  Start 5/17/17 at 11:30;  Stop 5/17/17 at 15:29


Magnesium Sulfate/ Dextrose 50 ml @ 25 mls/hr 1X  ONCE IV ;  Start 5/17/17 at 11

:00;  Stop 5/17/17 at 12:59


Morphine Sulfate (Ms Contin) 30 mg BID PO ;  Start 5/17/17 at 21:00


Cefpodoxime Proxetil (Vantin) 200 mg BID PO ;  Start 5/17/17 at 21:00





Active Scripts


Active


Ms Contin (Morphine Sulfate) 30 Mg Tablet.er 30 Tab PO BID


Vancomycin Hcl 500 Mg Vial 250 Mg PO [id number] 7 Days


Amoxicillin 875 Mg Tablet 1 Tab PO BID


Reported


Zinc Oxide 30 Gm Oint...g. 30 Gm TP BID


     karlos inner buttocks


Vitamin D3 (Cholecalciferol (Vitamin D3)) 5,000 Unit Tablet 1 Tab PO DAILY


Stress B With Zinc Tablet (Multivits,Stress Formula/Zinc) 1 Each Tablet 1 Each 

PO DAILY


Soma (Carisoprodol) 350 Mg Tablet 1 Tab PO DAILY


Potassium Chloride 10 Meq Capsule.er 10 Meq PO DAILY


Oxycodone Hcl 15 Mg Tablet 1 Tab PO Q6HRS PRN


Norvasc (Amlodipine Besylate) 10 Mg Tablet 10 Mg PO DAILY


Nitrostat (Nitroglycerin) 0.4 Mg Tab.subl 0.4 Mg SL PRN Q5MIN PRN


Magnesium (Magnesium Oxide) 400 Mg Capsule 400 Mg PO DAILY


Lyrica (Pregabalin) 75 Mg Capsule 75 Mg PO TID


Lexapro (Escitalopram Oxalate) 5 Mg Tablet 1 Tab PO HS


Escitalopram Oxalate 10 Mg Tablet 1 Tab PO HS


Lasix (Furosemide) 40 Mg Tablet 1 Tab PO DAILY


Lantus (Insulin Glargine,Hum.rec.anlog) 100 Unit/1 Ml Vial 60 Unit SQ DAILY


Imodium A-D (Loperamide HCl) 2 Mg Capsule 2 Mg PO PRN PRN


     after each loose stool up to 16mg in 24 hours


Humalog (Insulin Lispro) 100 Unit/1 Ml Insuln.pen 0-8 Unit SQ TIDAC


     if fs bs


     150-200= 1 unit


     201-250= 2 units


     250-300= 4 units


     301-350= 6 units


     351-400= 8 units


     >401 call dr. Silverman (Insulin Lispro) 100 Unit/1 Ml Cartridge 6 Unit SQ TIDAC


Baclofen 10 Mg Tablet 1 Tab PO BID


Ascorbic Acid 500 Mg Tablet 500 Mg PO DAILY


Androgel (Testosterone) 5 Gm Gel.packet 2.5 Gm TP QODAY


Tylenol (Acetaminophen) 325 Mg Tablet 650 Mg PO Q6HRS PRN


Carisoprodol 350 Mg Tablet 350 Mg PO Q8HRS PRN


Temazepam 30 Mg Capsule 1 Cap PO QHS


Crestor (Rosuvastatin Calcium) 40 Mg Tablet 20 Mg PO QHS


Losartan Potassium 50 Mg Tablet 100 Mg PO DAILY


Atenolol 50 Mg Tablet 1 Tab PO BID


Metformin Hcl 500 Mg Tablet 1 Tab PO BID


Aspirin 81 Mg Tab.chew 81 Mg PO DAILY


Sennosides-Docusate Sodium Tab (Sennosides/Docusate Sodium) 1 Each Tablet 2 

Each PO BID


Miralax (Polyethylene Glycol 3350) 17 Gm Powd.pack 17 Gm PO PRN PRN


Vitals/I & O





Vital Sign - Last 24 Hours








 5/16/17 5/16/17 5/16/17 5/16/17





 15:00 19:35 19:43 20:00


 


Temp 98.0  97.4 





 98.0  97.4 


 


Pulse 59  59 


 


Resp 16 20  


 


B/P (MAP) 121/60 (80)  135/72 (93) 


 


Pulse Ox 96 96 93 


 


O2 Delivery Room Air Nasal Cannula Nasal Cannula Nasal Cannula


 


O2 Flow Rate  2.0 2.0 2.0


 


    





    





 5/16/17 5/16/17 5/16/17 5/16/17





 20:35 22:38 22:41 23:14


 


Temp    97.9





    97.9


 


Pulse   59 59


 


Resp 20 20  20


 


B/P (MAP)   121/61 121/61 (81)


 


Pulse Ox 93 93  95


 


O2 Delivery Nasal Cannula Nasal Cannula  Nasal Cannula


 


O2 Flow Rate 2.0 2.0  2.0


 


    





    





 5/17/17 5/17/17 5/17/17 5/17/17





 02:38 03:25 07:28 08:00


 


Temp   98.7 





   98.7 


 


Pulse  55 60 


 


Resp 20 20 20 


 


B/P (MAP)  141/67 (91) 142/72 (95) 


 


Pulse Ox 94 94 94 


 


O2 Delivery Nasal Cannula Nasal Cannula Nasal Cannula Nasal Cannula


 


O2 Flow Rate 2.0 2.0 2.0 2.0


 


    





    





 5/17/17 5/17/17 5/17/17 5/17/17





 09:26 09:27 09:29 09:29


 


Pulse  60 60 60


 


Resp 18   


 


B/P (MAP)  142/72 142/72 142/72


 


Pulse Ox 94   


 


O2 Delivery Nasal Cannula   


 


O2 Flow Rate 2.0   





 5/17/17   





 10:35   


 


Temp 99.0   





 99.0   


 


Pulse 58   


 


Resp 20   


 


B/P (MAP) 137/69 (91)   


 


Pulse Ox 92   


 


O2 Delivery Nasal Cannula   


 


O2 Flow Rate 2.0   














Intake and Output   


 


 5/16/17 5/16/17 5/17/17





 15:00 23:00 07:00


 


Intake Total 500 ml 800 ml 1180 ml


 


Balance 500 ml 800 ml 1180 ml

















MOY LÓPEZ MD May 17, 2017 12:59

## 2017-05-17 NOTE — PDOC3
Discharge Summary


Visit Information


Date of Admission:  May 13, 2017


Date of Discharge:  May 17, 2017


Admitting Diagnosis:  sepsis


Final Diagnosis


1.  Toxic encephalopathy from infection


2.  UTI:  GNR   pan sens


3.  C.diff colitis vanco PO


4.  DM2


5.  HTN:  


6.  PAD:  hx AKA.   w/ weakness and debility


7.  Chronic back pain:   


 








Problems


Medical Problems:


(1) Altered mental status


Status: Acute  





(2) Hypotension


Status: Acute  








Brief Hospital Course


Allergies





 Allergies








Coded Allergies Type Severity Reaction Last Updated Verified


 


  levofloxacin Allergy Intermediate  9/23/14 Yes








Vital Signs





Vital Signs








  Date Time  Temp Pulse Resp B/P (MAP) Pulse Ox O2 Delivery O2 Flow Rate FiO2


 


5/17/17 10:35 99.0 58 20 137/69 (91) 92 Nasal Cannula 2.0 





 99.0       








Lab Results





Laboratory Tests








Test


  5/15/17


15:30 5/15/17


16:50 5/15/17


20:58 5/16/17


01:35


 


Potassium Level


  3.0 mmol/L


(3.5-5.1) 


  


  


 


 


Glucose (Fingerstick)


  


  194 mg/dL


(70-99) 194 mg/dL


(70-99) 115 mg/dL


(70-99)


 


Test


  5/16/17


05:15 5/16/17


08:16 5/16/17


12:05 5/16/17


16:03


 


White Blood Count


  13.5 x10^3/uL


(4.0-11.0) 


  


  


 


 


Red Blood Count


  5.35 x10^6/uL


(4.30-5.70) 


  


  


 


 


Hemoglobin


  15.0 g/dL


(13.0-17.5) 


  


  


 


 


Hematocrit


  45.8 %


(39.0-53.0) 


  


  


 


 


Mean Corpuscular Volume 86 fL ()    


 


Mean Corpuscular Hemoglobin 28 pg (25-35)    


 


Mean Corpuscular Hemoglobin


Concent 33 g/dL


(31-37) 


  


  


 


 


Red Cell Distribution Width


  15.6 %


(11.5-14.5) 


  


  


 


 


Platelet Count


  243 x10^3/uL


(140-400) 


  


  


 


 


Sodium Level


  140 mmol/L


(136-145) 


  


  


 


 


Potassium Level


  3.7 mmol/L


(3.5-5.1) 


  


  


 


 


Chloride Level


  106 mmol/L


() 


  


  


 


 


Carbon Dioxide Level


  28 mmol/L


(21-32) 


  


  


 


 


Anion Gap 6 (6-14)    


 


Blood Urea Nitrogen


  10 mg/dL


(8-26) 


  


  


 


 


Creatinine


  0.7 mg/dL


(0.7-1.3) 


  


  


 


 


Estimated GFR


(Cockcroft-Gault) 113.5 


  


  


  


 


 


Glucose Level


  113 mg/dL


(70-99) 


  


  


 


 


Calcium Level


  7.8 mg/dL


(8.5-10.1) 


  


  


 


 


Magnesium Level


  1.9 mg/dL


(1.8-2.4) 


  


  


 


 


Glucose (Fingerstick)


  


  121 mg/dL


(70-99) 143 mg/dL


(70-99) 105 mg/dL


(70-99)


 


Test


  5/17/17


07:35 5/17/17


09:20 5/17/17


11:24 


 


 


Glucose (Fingerstick)


  124 mg/dL


(70-99) 


  154 mg/dL


(70-99) 


 


 


White Blood Count


  


  9.7 x10^3/uL


(4.0-11.0) 


  


 


 


Red Blood Count


  


  5.33 x10^6/uL


(4.30-5.70) 


  


 


 


Hemoglobin


  


  15.3 g/dL


(13.0-17.5) 


  


 


 


Hematocrit


  


  46.3 %


(39.0-53.0) 


  


 


 


Mean Corpuscular Volume  87 fL ()   


 


Mean Corpuscular Hemoglobin  29 pg (25-35)   


 


Mean Corpuscular Hemoglobin


Concent 


  33 g/dL


(31-37) 


  


 


 


Red Cell Distribution Width


  


  15.7 %


(11.5-14.5) 


  


 


 


Platelet Count


  


  174 x10^3/uL


(140-400) 


  


 


 


Sodium Level


  


  140 mmol/L


(136-145) 


  


 


 


Potassium Level


  


  4.0 mmol/L


(3.5-5.1) 


  


 


 


Chloride Level


  


  107 mmol/L


() 


  


 


 


Carbon Dioxide Level


  


  26 mmol/L


(21-32) 


  


 


 


Anion Gap  7 (6-14)   


 


Blood Urea Nitrogen  8 mg/dL (8-26)   


 


Creatinine


  


  0.7 mg/dL


(0.7-1.3) 


  


 


 


Estimated GFR


(Cockcroft-Gault) 


  113.5 


  


  


 


 


Glucose Level


  


  181 mg/dL


(70-99) 


  


 


 


Calcium Level


  


  7.8 mg/dL


(8.5-10.1) 


  


 


 


Magnesium Level


  


  1.6 mg/dL


(1.8-2.4) 


  


 








Laboratory Tests








Test


  5/16/17


16:03 5/17/17


07:35 5/17/17


09:20 5/17/17


11:24


 


Glucose (Fingerstick)


  105 mg/dL


(70-99) 124 mg/dL


(70-99) 


  154 mg/dL


(70-99)


 


White Blood Count


  


  


  9.7 x10^3/uL


(4.0-11.0) 


 


 


Red Blood Count


  


  


  5.33 x10^6/uL


(4.30-5.70) 


 


 


Hemoglobin


  


  


  15.3 g/dL


(13.0-17.5) 


 


 


Hematocrit


  


  


  46.3 %


(39.0-53.0) 


 


 


Mean Corpuscular Volume   87 fL ()  


 


Mean Corpuscular Hemoglobin   29 pg (25-35)  


 


Mean Corpuscular Hemoglobin


Concent 


  


  33 g/dL


(31-37) 


 


 


Red Cell Distribution Width


  


  


  15.7 %


(11.5-14.5) 


 


 


Platelet Count


  


  


  174 x10^3/uL


(140-400) 


 


 


Sodium Level


  


  


  140 mmol/L


(136-145) 


 


 


Potassium Level


  


  


  4.0 mmol/L


(3.5-5.1) 


 


 


Chloride Level


  


  


  107 mmol/L


() 


 


 


Carbon Dioxide Level


  


  


  26 mmol/L


(21-32) 


 


 


Anion Gap   7 (6-14)  


 


Blood Urea Nitrogen   8 mg/dL (8-26)  


 


Creatinine


  


  


  0.7 mg/dL


(0.7-1.3) 


 


 


Estimated GFR


(Cockcroft-Gault) 


  


  113.5 


  


 


 


Glucose Level


  


  


  181 mg/dL


(70-99) 


 


 


Calcium Level


  


  


  7.8 mg/dL


(8.5-10.1) 


 


 


Magnesium Level


  


  


  1.6 mg/dL


(1.8-2.4) 


 








Brief Hospital Course


Mr. Wells  is a 62 old admit with confusion and wekaness and diarrhea


_ UTI,  pan sens,   DC on Amox X10 days, complicated UTI


c. diff pos,  stools normal at DC,  diarrhea stopped X24 hours,   wears brief 

at baseline.





Discharge Information


Condition at Discharge:  Improved


Follow Up:  Weeks


Disposition/Orders:  D/C to Home


Scheduled


Amlodipine Besylate (Norvasc), 10 MG PO DAILY, (Reported)


Amoxicillin (Amoxicillin), 1 TAB PO BID


Ascorbic Acid (Ascorbic Acid), 500 MG PO DAILY, (Reported)


Aspirin (Aspirin), 81 MG PO DAILY, (Reported)


Atenolol (Atenolol), 1 TAB PO BID, (Reported)


Baclofen (Baclofen), 1 TAB PO BID, (Reported)


Carisoprodol (Soma), 1 TAB PO DAILY, (Reported)


Cholecalciferol (Vitamin D3) (Vitamin D3), 1 TAB PO DAILY, (Reported)


Escitalopram Oxalate (Escitalopram Oxalate), 1 TAB PO HS, (Reported)


Escitalopram Oxalate (Lexapro), 1 TAB PO HS, (Reported)


Furosemide (Lasix), 1 TAB PO DAILY, (Reported)


Insulin Glargine,Hum.rec.anlog (Lantus), 60 UNIT SQ DAILY, (Reported)


Insulin Lispro (Humalog), 6 UNIT SQ TIDAC, (Reported)


Insulin Lispro (Humalog), 0-8 UNIT SQ TIDAC, (Reported)


Losartan Potassium (Losartan Potassium), 100 MG PO DAILY, (Reported)


Magnesium Oxide (Magnesium), 400 MG PO DAILY, (Reported)


Metformin Hcl (Metformin Hcl), 1 TAB PO BID, (Reported)


Morphine Sulfate Er (Ms Contin), 30 TAB PO BID


Multivits,Stress Formula/Zinc (Stress B With Zinc Tablet), 1 EACH PO DAILY, (

Reported)


Potassium Chloride (Potassium Chloride), 10 MEQ PO DAILY, (Reported)


Pregabalin (Lyrica), 75 MG PO TID, (Reported)


Rosuvastatin Calcium (Crestor), 20 MG PO QHS, (Reported)


Sennosides/Docusate Sodium (Sennosides-Docusate Sodium Tab), 2 EACH PO BID, (

Reported)


Temazepam (Temazepam), 1 CAP PO QHS, (Reported)


Testosterone (Androgel), 2.5 GM TP QODAY, (Reported)


Vancomycin Hcl (Vancomycin Hcl), 250 MG PO [id number]


Zinc Oxide (Zinc Oxide), 30 GM TP BID, (Reported)





Scheduled PRN


Acetaminophen (Tylenol), 650 MG PO Q6HRS PRN for PAIN, (Reported)


Carisoprodol (Carisoprodol), 350 MG PO Q8HRS PRN for PAIN, (Reported)


Loperamide HCl (Imodium A-D), 2 MG PO PRN PRN for DIARRHEA, (Reported)


Nitroglycerin (Nitrostat), 0.4 MG SL PRN Q5MIN PRN for CHEST PAIN, (Reported)


Oxycodone Hcl (Oxycodone Hcl), 1 TAB PO Q6HRS PRN for PAIN, (Reported)


Polyethylene Glycol 3350 (Miralax), 17 GM PO PRN PRN for CONSTIPATION, (Reported

)





Patient Instructions


Patient Instructions


time > 30min


angina, chest pain,   prior + stress test At [facility]


CV team wanted to do MPI, pt refuses


DC to [facility]











MOY LÓPEZ MD May 17, 2017 13:01

## 2017-05-17 NOTE — PDOC
CARDIO Progress Notes


Date and Time


Date of Service


05/17/2017


Time of Evaluation


1051





Subjective


Subjective:  No Chest Pain, No Palpitations, No Dizziness, Other (drowsy & 

lethargic)





Vitals


Vitals





Vital Signs








  Date Time  Temp Pulse Resp B/P (MAP) Pulse Ox O2 Delivery O2 Flow Rate FiO2


 


5/17/17 09:29  60  142/72    


 


5/17/17 09:26   18  94 Nasal Cannula 2.0 


 


5/17/17 07:28 98.7       





 98.7       








Weight


Weight [ ]





Input and Output


Intake and Output











Intake and Output 


 


 5/17/17





 07:00


 


Intake Total 2480 ml


 


Balance 2480 ml


 


 


 


Intake Oral 1380 ml


 


IV Total 1100 ml


 


# Voids 7


 


# Bowel Movements 1











Laboratory


Labs





Laboratory Tests








Test


  5/16/17


12:05 5/16/17


16:03 5/17/17


07:35 5/17/17


09:20


 


Glucose (Fingerstick)


  143 mg/dL


(70-99) 105 mg/dL


(70-99) 124 mg/dL


(70-99) 


 


 


White Blood Count


  


  


  


  9.7 x10^3/uL


(4.0-11.0)


 


Red Blood Count


  


  


  


  5.33 x10^6/uL


(4.30-5.70)


 


Hemoglobin


  


  


  


  15.3 g/dL


(13.0-17.5)


 


Hematocrit


  


  


  


  46.3 %


(39.0-53.0)


 


Mean Corpuscular Volume    87 fL () 


 


Mean Corpuscular Hemoglobin    29 pg (25-35) 


 


Mean Corpuscular Hemoglobin


Concent 


  


  


  33 g/dL


(31-37)


 


Red Cell Distribution Width


  


  


  


  15.7 %


(11.5-14.5)


 


Platelet Count


  


  


  


  174 x10^3/uL


(140-400)


 


Sodium Level


  


  


  


  140 mmol/L


(136-145)


 


Potassium Level


  


  


  


  4.0 mmol/L


(3.5-5.1)


 


Chloride Level


  


  


  


  107 mmol/L


()


 


Carbon Dioxide Level


  


  


  


  26 mmol/L


(21-32)


 


Anion Gap    7 (6-14) 


 


Blood Urea Nitrogen    8 mg/dL (8-26) 


 


Creatinine


  


  


  


  0.7 mg/dL


(0.7-1.3)


 


Estimated GFR


(Cockcroft-Gault) 


  


  


  113.5 


 


 


Glucose Level


  


  


  


  181 mg/dL


(70-99)


 


Calcium Level


  


  


  


  7.8 mg/dL


(8.5-10.1)


 


Magnesium Level


  


  


  


  1.6 mg/dL


(1.8-2.4)











Microbiology


Micro





Microbiology


5/13/17 Blood Culture - Preliminary, Resulted


          NO GROWTH AFTER 3 DAYS


5/13/17 Urine Culture - Final, Complete


          


5/13/17 Urine Culture Result 1 ([facility]) - Final, Complete


          


5/13/17 Antimicrobic Susceptibility - Final, Complete





Physical Exam


HEENT:  Neck Supple W Full Motion


Chest:  Symmetric, Other (reproducible CP in lower 1/3 of sternum)


LUNGS:  Other (decreased in bases anteriorly)


Heart:  S1S2, RRR, no murmurs


Abdomen:  Soft N/T


Extremities:  Other (right AKA amputation; 2+ LLE edema)


Neurology:  alert, follow commands





Assessment


Assessment


1. Elevated troponin in the setting of urosepsis


   troponin peaked at 0.1 - echo with preserved LV function @ 60-65% and no 

segmental wall motion abnormalities


   associated with encephalopathy which is improved   


   MPI results from [facility] below = abnormal with perfusion defect anteriorly; 

anteriolaterally and inferolaterally


      states he was not cathed; advised medical management - he has not 

followed up since then





2. HTN


   improved control today


   taking his meds


   


3. PAD


   previous right AKA





4. CAD s/p prior remote PCI(details unknown)


   see #1


   treated with ASA, statins, beta-blockers and ARB





Other


Comments


REVIEW OF [facility] RECORDS: 





REGADENOSON MPI DONE 2/25/2015: 


   MODERATE SIZED, MODERATE TO SEVERE INTENSITY MIXED BUT PREDOMINANTLY 

REVERSIBLE DEFECT INVOLVING BASAL 


   TO DISTAL ANTERIOR, INFEROLATERAL AND PORTIONS OF ANTEROLATERAL WALL 

CONSISTENT WITH ISCHEMIA











NIGEL SHAW May 17, 2017 11:05

## 2017-06-19 ENCOUNTER — HOSPITAL ENCOUNTER (INPATIENT)
Dept: HOSPITAL 61 - ER | Age: 62
LOS: 2 days | Discharge: NURSING, REHAB OR LONG-TERM CARE | DRG: 280 | End: 2017-06-21
Attending: INTERNAL MEDICINE | Admitting: INTERNAL MEDICINE
Payer: COMMERCIAL

## 2017-06-19 VITALS — SYSTOLIC BLOOD PRESSURE: 116 MMHG | DIASTOLIC BLOOD PRESSURE: 58 MMHG

## 2017-06-19 VITALS — SYSTOLIC BLOOD PRESSURE: 108 MMHG | DIASTOLIC BLOOD PRESSURE: 57 MMHG

## 2017-06-19 VITALS — SYSTOLIC BLOOD PRESSURE: 98 MMHG | DIASTOLIC BLOOD PRESSURE: 59 MMHG

## 2017-06-19 VITALS — BODY MASS INDEX: 34.41 KG/M2 | HEIGHT: 68 IN | WEIGHT: 227.06 LBS

## 2017-06-19 VITALS — DIASTOLIC BLOOD PRESSURE: 58 MMHG | SYSTOLIC BLOOD PRESSURE: 116 MMHG

## 2017-06-19 VITALS — DIASTOLIC BLOOD PRESSURE: 64 MMHG | SYSTOLIC BLOOD PRESSURE: 143 MMHG

## 2017-06-19 VITALS — DIASTOLIC BLOOD PRESSURE: 80 MMHG | SYSTOLIC BLOOD PRESSURE: 147 MMHG

## 2017-06-19 VITALS — SYSTOLIC BLOOD PRESSURE: 106 MMHG | DIASTOLIC BLOOD PRESSURE: 60 MMHG

## 2017-06-19 DIAGNOSIS — Z89.611: ICD-10-CM

## 2017-06-19 DIAGNOSIS — G47.00: ICD-10-CM
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
## 2017-06-19 DIAGNOSIS — Z88.1: ICD-10-CM

## 2017-06-19 DIAGNOSIS — Z95.5: ICD-10-CM

## 2017-06-19 DIAGNOSIS — I21.4: Primary | ICD-10-CM

## 2017-06-19 DIAGNOSIS — I25.10: ICD-10-CM

## 2017-06-19 DIAGNOSIS — J45.909: ICD-10-CM

## 2017-06-19 DIAGNOSIS — S82.392A: ICD-10-CM

## 2017-06-19 DIAGNOSIS — E11.65: ICD-10-CM

## 2017-06-19 DIAGNOSIS — R41.82: ICD-10-CM

## 2017-06-19 DIAGNOSIS — E78.5: ICD-10-CM

## 2017-06-19 DIAGNOSIS — Z83.3: ICD-10-CM

## 2017-06-19 DIAGNOSIS — Z82.49: ICD-10-CM

## 2017-06-19 DIAGNOSIS — I50.33: ICD-10-CM
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
## 2017-06-19 DIAGNOSIS — E78.00: ICD-10-CM

## 2017-06-19 DIAGNOSIS — M62.82: ICD-10-CM

## 2017-06-19 DIAGNOSIS — L97.429: ICD-10-CM

## 2017-06-19 DIAGNOSIS — M19.90: ICD-10-CM

## 2017-06-19 DIAGNOSIS — E11.51: ICD-10-CM

## 2017-06-19 DIAGNOSIS — N39.0: ICD-10-CM

## 2017-06-19 DIAGNOSIS — I11.0: ICD-10-CM

## 2017-06-19 DIAGNOSIS — F32.9: ICD-10-CM

## 2017-06-19 DIAGNOSIS — T40.605A: ICD-10-CM

## 2017-06-19 LAB
ALBUMIN SERPL-MCNC: 3.3 G/DL (ref 3.4–5)
ALBUMIN/GLOB SERPL: 0.7 {RATIO} (ref 1–1.7)
ALP SERPL-CCNC: 117 U/L (ref 46–116)
ALT SERPL-CCNC: 38 U/L (ref 16–63)
ANION GAP SERPL CALC-SCNC: 3 MMOL/L (ref 6–14)
AST SERPL-CCNC: 44 U/L (ref 15–37)
BASOPHILS # BLD AUTO: 0.1 X10^3/UL (ref 0–0.2)
BASOPHILS NFR BLD: 1 % (ref 0–3)
BILIRUB SERPL-MCNC: 0.3 MG/DL (ref 0.2–1)
BUN SERPL-MCNC: 17 MG/DL (ref 8–26)
BUN/CREAT SERPL: 17 (ref 6–20)
CALCIUM SERPL-MCNC: 9.2 MG/DL (ref 8.5–10.1)
CHLORIDE SERPL-SCNC: 101 MMOL/L (ref 98–107)
CO2 SERPL-SCNC: 36 MMOL/L (ref 21–32)
CREAT SERPL-MCNC: 1 MG/DL (ref 0.7–1.3)
EOSINOPHIL NFR BLD: 1 % (ref 0–3)
ERYTHROCYTE [DISTWIDTH] IN BLOOD BY AUTOMATED COUNT: 16 % (ref 11.5–14.5)
GFR SERPLBLD BASED ON 1.73 SQ M-ARVRAT: 75.2 ML/MIN
GLOBULIN SER-MCNC: 4.8 G/DL (ref 2.2–3.8)
GLUCOSE SERPL-MCNC: 135 MG/DL (ref 70–99)
HCT VFR BLD CALC: 48.7 % (ref 39–53)
HGB BLD-MCNC: 15.5 G/DL (ref 13–17.5)
LYMPHOCYTES # BLD: 2.6 X10^3/UL (ref 1–4.8)
LYMPHOCYTES NFR BLD AUTO: 24 % (ref 24–48)
MCH RBC QN AUTO: 28 PG (ref 25–35)
MCHC RBC AUTO-ENTMCNC: 32 G/DL (ref 31–37)
MCV RBC AUTO: 88 FL (ref 79–100)
MONOCYTES NFR BLD: 8 % (ref 0–9)
NEUTROPHILS NFR BLD AUTO: 67 % (ref 31–73)
PLATELET # BLD AUTO: 227 X10^3/UL (ref 140–400)
POTASSIUM SERPL-SCNC: 5 MMOL/L (ref 3.5–5.1)
PROT SERPL-MCNC: 8.1 G/DL (ref 6.4–8.2)
RBC # BLD AUTO: 5.56 X10^6/UL (ref 4.3–5.7)
SODIUM SERPL-SCNC: 140 MMOL/L (ref 136–145)
WBC # BLD AUTO: 10.9 X10^3/UL (ref 4–11)

## 2017-06-19 PROCEDURE — 73700 CT LOWER EXTREMITY W/O DYE: CPT

## 2017-06-19 PROCEDURE — 82962 GLUCOSE BLOOD TEST: CPT

## 2017-06-19 PROCEDURE — 80048 BASIC METABOLIC PNL TOTAL CA: CPT

## 2017-06-19 PROCEDURE — 70450 CT HEAD/BRAIN W/O DYE: CPT

## 2017-06-19 PROCEDURE — 85027 COMPLETE CBC AUTOMATED: CPT

## 2017-06-19 PROCEDURE — 71010: CPT

## 2017-06-19 PROCEDURE — 96375 TX/PRO/DX INJ NEW DRUG ADDON: CPT

## 2017-06-19 PROCEDURE — 96374 THER/PROPH/DIAG INJ IV PUSH: CPT

## 2017-06-19 PROCEDURE — 82550 ASSAY OF CK (CPK): CPT

## 2017-06-19 PROCEDURE — 36415 COLL VENOUS BLD VENIPUNCTURE: CPT

## 2017-06-19 PROCEDURE — 80053 COMPREHEN METABOLIC PANEL: CPT

## 2017-06-19 PROCEDURE — 93308 TTE F-UP OR LMTD: CPT

## 2017-06-19 PROCEDURE — 29515 APPLICATION SHORT LEG SPLINT: CPT

## 2017-06-19 PROCEDURE — 94760 N-INVAS EAR/PLS OXIMETRY 1: CPT

## 2017-06-19 PROCEDURE — 84484 ASSAY OF TROPONIN QUANT: CPT

## 2017-06-19 PROCEDURE — 73610 X-RAY EXAM OF ANKLE: CPT

## 2017-06-19 PROCEDURE — 93005 ELECTROCARDIOGRAM TRACING: CPT

## 2017-06-19 PROCEDURE — A4314 CATH W/DRAINAGE 2-WAY LATEX: HCPCS

## 2017-06-19 PROCEDURE — 94250: CPT

## 2017-06-19 PROCEDURE — 83874 ASSAY OF MYOGLOBIN: CPT

## 2017-06-19 PROCEDURE — 73590 X-RAY EXAM OF LOWER LEG: CPT

## 2017-06-19 PROCEDURE — 96372 THER/PROPH/DIAG INJ SC/IM: CPT

## 2017-06-19 PROCEDURE — 83880 ASSAY OF NATRIURETIC PEPTIDE: CPT

## 2017-06-19 RX ADMIN — MORPHINE SULFATE PRN MG: 2 INJECTION, SOLUTION INTRAMUSCULAR; INTRAVENOUS at 07:31

## 2017-06-19 RX ADMIN — HYDROCODONE BITARTRATE AND ACETAMINOPHEN PRN TAB: 5; 325 TABLET ORAL at 13:24

## 2017-06-19 RX ADMIN — ENOXAPARIN SODIUM SCH MG: 40 INJECTION SUBCUTANEOUS at 13:25

## 2017-06-19 RX ADMIN — FENTANYL CITRATE PRN MCG: 50 INJECTION INTRAMUSCULAR; INTRAVENOUS at 05:59

## 2017-06-19 RX ADMIN — CLOPIDOGREL BISULFATE SCH MG: 75 TABLET ORAL at 16:31

## 2017-06-19 RX ADMIN — FENTANYL CITRATE PRN MCG: 50 INJECTION INTRAMUSCULAR; INTRAVENOUS at 03:40

## 2017-06-19 RX ADMIN — MORPHINE SULFATE PRN MG: 2 INJECTION, SOLUTION INTRAMUSCULAR; INTRAVENOUS at 04:57

## 2017-06-19 RX ADMIN — MORPHINE SULFATE PRN MG: 2 INJECTION, SOLUTION INTRAMUSCULAR; INTRAVENOUS at 09:51

## 2017-06-19 RX ADMIN — HYDROCODONE BITARTRATE AND ACETAMINOPHEN PRN TAB: 5; 325 TABLET ORAL at 21:36

## 2017-06-19 RX ADMIN — FUROSEMIDE SCH MG: 40 TABLET ORAL at 10:31

## 2017-06-19 RX ADMIN — ESCITALOPRAM OXALATE SCH MG: 10 TABLET ORAL at 21:34

## 2017-06-19 RX ADMIN — ATENOLOL SCH MG: 50 TABLET ORAL at 21:35

## 2017-06-19 RX ADMIN — MORPHINE SULFATE PRN MG: 2 INJECTION, SOLUTION INTRAMUSCULAR; INTRAVENOUS at 13:24

## 2017-06-19 RX ADMIN — TEMAZEPAM SCH MG: 15 CAPSULE ORAL at 21:35

## 2017-06-19 RX ADMIN — INSULIN DETEMIR SCH UNITS: 100 INJECTION, SOLUTION SUBCUTANEOUS at 16:33

## 2017-06-19 RX ADMIN — ATENOLOL SCH MG: 50 TABLET ORAL at 10:32

## 2017-06-19 RX ADMIN — INSULIN ASPART SCH UNITS: 100 INJECTION, SOLUTION INTRAVENOUS; SUBCUTANEOUS at 16:34

## 2017-06-19 RX ADMIN — BACLOFEN SCH MG: 10 TABLET ORAL at 21:35

## 2017-06-19 RX ADMIN — FENTANYL CITRATE PRN MCG: 50 INJECTION INTRAMUSCULAR; INTRAVENOUS at 03:15

## 2017-06-19 RX ADMIN — LOSARTAN POTASSIUM SCH MG: 50 TABLET ORAL at 16:31

## 2017-06-19 RX ADMIN — ATORVASTATIN CALCIUM SCH MG: 40 TABLET, FILM COATED ORAL at 21:34

## 2017-06-19 NOTE — RAD
INDICATION: TRAUMA, FALL, LEFT ANKLE PAIN

 

COMPARISON: None.

 

TECHNIQUE:

 

Axial CT images obtained through the left ankle.

 

One or more of the following individualized dose reduction techniques were

utilized for this examination:  1. Automated exposure control;  2. 

Adjustment of the mA and/or kV according to patient size;  3. Use of 

iterative reconstruction technique.

 

FINDINGS:

 

There is patchy osseous demineralization seen throughout the osseous 

structures.

Mildly displaced intra-articular fracture of the distal tibia.

Partially visualized degenerative changes of the midfoot.

 

IMPRESSION:

 

Intra-articular fracture of the distal tibia with mild displacement.

 

Patchy osseous demineralization is seen throughout. Could be from benign 

osteopenia but cannot exclude a marrow infiltrative process on this exam.

 

There is edema within the soft tissues most confluent adjacent to the 

tibial fracture which could be seen with a small amount of associated 

blood in the soft tissues.

 

Electronically signed by: Hudson German MD (6/19/2017 4:49 AM)

## 2017-06-19 NOTE — EKG
[facility]

              [address] [place] [address]

Test Date:    2017               Test Time:    11:53:09

Pat Name:     ZUNILDA MCMANUS              Department:   

Patient ID:   [id number]           Room:         201 1

Gender:       M                        Technician:   

:          [id number]               Requested By: JODIE KASPER

Order Number: [id number]            Reading MD:   Ankush Rodriguez

                                 Measurements

Intervals                              Axis          

Rate:         70                       P:            52

ID:           [id number]                      QRS:          -30

QRSD:         92                       T:            -38

QT:           406                                    

QTc:          441                                    

                           Interpretive Statements

SINUS RHYTHM

ABNORMAL LEFT AXIS DEVIATION

R-S TRANSITION ZONE IN V LEADS DISPLACED TO THE LEFT

QRS(T) CONTOUR ABNORMALITY

CONSIDER ANTEROSEPTAL MYOCARDIAL DAMAGE

ST & T ABNORMALITY, CONSIDER

INFERIOR ISCHEMIA OR LEFT VENTRICULAR STRAIN

ABNORMAL ECG

RI6.01

Compared to ECG 2017 02:27:33

T-wave abnormality now present

Possible ischemia now present

ST (T wave) deviation no longer present



Electronically Signed On 2017 11:13:47 CDT by Ankush Rodriguez

## 2017-06-19 NOTE — PDOC1
History and Physical


Past Medical History


Cardiovascular:  CAD, HTN, Hyperlipidemia, Other (PAD)


Pulmonary:  Asthma


Psych:  Depression


Endocrine:  Diabetes (2)


Dermatology:  Other (left heel ulcer)





Past Surgical History


Past Surgical History:  Other (bilateral fempop; RAKA)





Family History


Family History:  Diabetes, Hypertension





Social History


Smoke:  No


ALCOHOL:  none


Drugs:  None





Current Problem List


Problem List


Problems


Medical Problems:


(1) Fracture of distal end of tibia


Status: Acute  





(2) Opiate overdose


Status: Acute  











Current Medications


Current Medications





Current Medications








 Medications


  (Trade)  Dose


 Ordered  Sig/Shana  Start Time


 Stop Time Status Last Admin


Dose Admin


 


 Acetaminophen


  (Tylenol)  325 mg  PRN Q6HRS  PRN  6/19/17 09:00


     


 


 


 Acetaminophen/


 Hydrocodone Bitart


  (Lortab 5/325)  1 tab  PRN Q6HRS  PRN  6/19/17 09:00


     


 


 


 Albuterol Sulfate


  (Ventolin Neb


 Soln)  2.5 mg  PRN Q4HRS  PRN  6/19/17 09:00


     


 


 


 Amlodipine


 Besylate


  (Norvasc)  10 mg  DAILY  6/19/17 10:30


    6/19/17 10:31


10 MG


 


 Aspirin


  (Bj Aspirin)  325 mg  1X  ONCE  6/19/17 03:30


 6/19/17 03:31 DC 6/19/17 03:45


325 MG


 


 Atenolol


  (Tenormin)  50 mg  BID  6/19/17 10:30


    6/19/17 10:32


50 MG


 


 Enoxaparin Sodium


  (Lovenox 40mg


 Syringe)  40 mg  Q24H  6/19/17 12:00


     


 


 


 Fentanyl Citrate


  (Fentanyl 2ml


 Vial)  50 mcg  PRN Q15MIN  PRN  6/19/17 03:15


 6/20/17 03:14  6/19/17 05:59


50 MCG


 


 Furosemide


  (Lasix)  40 mg  DAILY  6/19/17 10:30


    6/19/17 10:31


40 MG


 


 Hydralazine HCl


  (Apresoline)  10 mg  PRN Q4HRS  PRN  6/19/17 09:00


     


 


 


 Morphine Sulfate  2 mg  PRN Q2HR  PRN  6/19/17 03:15


 6/20/17 03:14  6/19/17 09:51


2 MG


 


 Naloxone HCl


  (Narcan)  0.8 mg  1X  ONCE  6/19/17 03:00


 6/19/17 03:01 DC 6/19/17 02:00


0.8 MG


 


 Nitroglycerin


  (Nitrostat)  0.4 mg  PRN Q5MIN  PRN  6/19/17 03:15


 6/20/17 03:14   


 


 


 Ondansetron HCl


  (Zofran)  4 mg  PRN Q8HRS  PRN  6/19/17 09:00


     


 











Allergies


Allergies





Allergies








Coded Allergies Type Severity Reaction Last Updated Verified


 


  levofloxacin Allergy Intermediate  9/23/14 Yes











ROS


Review of System


CONSTITUTIONAL:        fall


EYES:                          No recent changes


SKIN:               No rash or itching


CARDIOVASCULAR:     No chest pain, syncope, palpitations, or edema


RESPIRATORY:            No SOB or cough


GASTROINTESTINAL:    No nausea, vomiting or abdominal pain


NEUROLOGICAL:          No headaches or weakness


ENDOCRINE:               No cold or heat intolerance


GENITOURINARY:         No urgency or frequency of urination


MUSCULOSKELETAL:   No back pain or joint pain


LYMPHATICS:               No enlarged lymph nodes


PSYCHIATRIC:              No anxiety or depression





Physical Exam


Physical Exam


GEN.:    No apparent distress.  Alert and oriented times, 3, slow to respond


HEENT:    Head is normocephalic, atraumatic


NECK:    Supple.  


LUNGS:    Clear to auscultation.


HEART:    RRR, S1, S2 present.  Peripheral pulses intact


ABDOMEN:    Soft, nontender.  Positive bowel sounds.


EXTREMITIES:    R AKA    


NEUROLOGIC:     Normal speech, normal tone


PSYCHIATRIC:    Normal affect, normal mood.


SKIN:   EDEMA IN L LETY





Vitals


Vitals





Vital Signs








  Date Time  Temp Pulse Resp B/P (MAP) Pulse Ox O2 Delivery O2 Flow Rate FiO2


 


6/19/17 11:15 98.2 64 20 116/58 (77) 91 Nasal Cannula  





 98.2       


 


6/19/17 10:21       4.0 











Labs


Labs





Laboratory Tests








Test


  6/19/17


02:09 6/19/17


07:44 6/19/17


09:05


 


White Blood Count


  10.9 x10^3/uL


(4.0-11.0) 


  


 


 


Red Blood Count


  5.56 x10^6/uL


(4.30-5.70) 


  


 


 


Hemoglobin


  15.5 g/dL


(13.0-17.5) 


  


 


 


Hematocrit


  48.7 %


(39.0-53.0) 


  


 


 


Mean Corpuscular Volume 88 fL ()   


 


Mean Corpuscular Hemoglobin 28 pg (25-35)   


 


Mean Corpuscular Hemoglobin


Concent 32 g/dL


(31-37) 


  


 


 


Red Cell Distribution Width


  16.0 %


(11.5-14.5) 


  


 


 


Platelet Count


  227 x10^3/uL


(140-400) 


  


 


 


Neutrophils (%) (Auto) 67 % (31-73)   


 


Lymphocytes (%) (Auto) 24 % (24-48)   


 


Monocytes (%) (Auto) 8 % (0-9)   


 


Eosinophils (%) (Auto) 1 % (0-3)   


 


Basophils (%) (Auto) 1 % (0-3)   


 


Neutrophils # (Auto)


  7.3 x10^3uL


(1.8-7.7) 


  


 


 


Lymphocytes # (Auto)


  2.6 x10^3/uL


(1.0-4.8) 


  


 


 


Monocytes # (Auto)


  0.9 x10^3/uL


(0.0-1.1) 


  


 


 


Eosinophils # (Auto)


  0.1 x10^3/uL


(0.0-0.7) 


  


 


 


Basophils # (Auto)


  0.1 x10^3/uL


(0.0-0.2) 


  


 


 


Sodium Level


  140 mmol/L


(136-145) 


  


 


 


Potassium Level


  5.0 mmol/L


(3.5-5.1) 


  


 


 


Chloride Level


  101 mmol/L


() 


  


 


 


Carbon Dioxide Level


  36 mmol/L


(21-32) 


  


 


 


Anion Gap 3 (6-14)   


 


Blood Urea Nitrogen


  17 mg/dL


(8-26) 


  


 


 


Creatinine


  1.0 mg/dL


(0.7-1.3) 


  


 


 


Estimated GFR


(Cockcroft-Gault) 75.2 


  


  


 


 


BUN/Creatinine Ratio 17 (6-20)   


 


Glucose Level


  135 mg/dL


(70-99) 


  


 


 


Calcium Level


  9.2 mg/dL


(8.5-10.1) 


  


 


 


Total Bilirubin


  0.3 mg/dL


(0.2-1.0) 


  


 


 


Aspartate Amino Transf


(AST/SGOT) 44 U/L (15-37) 


  


  


 


 


Alanine Aminotransferase


(ALT/SGPT) 38 U/L (16-63) 


  


  


 


 


Alkaline Phosphatase


  117 U/L


() 


  


 


 


Troponin I Quantitative


  1.212 ng/mL


(0.000-0.055) 


  0.887 ng/mL


(0.000-0.055)


 


NT-Pro-B-Type Natriuretic


Peptide 1027 pg/mL


(0-124) 


  


 


 


Total Protein


  8.1 g/dL


(6.4-8.2) 


  


 


 


Albumin


  3.3 g/dL


(3.4-5.0) 


  


 


 


Albumin/Globulin Ratio 0.7 (1.0-1.7)   


 


Glucose (Fingerstick)


  


  185 mg/dL


(70-99) 


 


 


Creatine Kinase


  


  


  431 U/L


()








Laboratory Tests








Test


  6/19/17


02:09 6/19/17


07:44 6/19/17


09:05


 


White Blood Count


  10.9 x10^3/uL


(4.0-11.0) 


  


 


 


Red Blood Count


  5.56 x10^6/uL


(4.30-5.70) 


  


 


 


Hemoglobin


  15.5 g/dL


(13.0-17.5) 


  


 


 


Hematocrit


  48.7 %


(39.0-53.0) 


  


 


 


Mean Corpuscular Volume 88 fL ()   


 


Mean Corpuscular Hemoglobin 28 pg (25-35)   


 


Mean Corpuscular Hemoglobin


Concent 32 g/dL


(31-37) 


  


 


 


Red Cell Distribution Width


  16.0 %


(11.5-14.5) 


  


 


 


Platelet Count


  227 x10^3/uL


(140-400) 


  


 


 


Neutrophils (%) (Auto) 67 % (31-73)   


 


Lymphocytes (%) (Auto) 24 % (24-48)   


 


Monocytes (%) (Auto) 8 % (0-9)   


 


Eosinophils (%) (Auto) 1 % (0-3)   


 


Basophils (%) (Auto) 1 % (0-3)   


 


Neutrophils # (Auto)


  7.3 x10^3uL


(1.8-7.7) 


  


 


 


Lymphocytes # (Auto)


  2.6 x10^3/uL


(1.0-4.8) 


  


 


 


Monocytes # (Auto)


  0.9 x10^3/uL


(0.0-1.1) 


  


 


 


Eosinophils # (Auto)


  0.1 x10^3/uL


(0.0-0.7) 


  


 


 


Basophils # (Auto)


  0.1 x10^3/uL


(0.0-0.2) 


  


 


 


Sodium Level


  140 mmol/L


(136-145) 


  


 


 


Potassium Level


  5.0 mmol/L


(3.5-5.1) 


  


 


 


Chloride Level


  101 mmol/L


() 


  


 


 


Carbon Dioxide Level


  36 mmol/L


(21-32) 


  


 


 


Anion Gap 3 (6-14)   


 


Blood Urea Nitrogen


  17 mg/dL


(8-26) 


  


 


 


Creatinine


  1.0 mg/dL


(0.7-1.3) 


  


 


 


Estimated GFR


(Cockcroft-Gault) 75.2 


  


  


 


 


BUN/Creatinine Ratio 17 (6-20)   


 


Glucose Level


  135 mg/dL


(70-99) 


  


 


 


Calcium Level


  9.2 mg/dL


(8.5-10.1) 


  


 


 


Total Bilirubin


  0.3 mg/dL


(0.2-1.0) 


  


 


 


Aspartate Amino Transf


(AST/SGOT) 44 U/L (15-37) 


  


  


 


 


Alanine Aminotransferase


(ALT/SGPT) 38 U/L (16-63) 


  


  


 


 


Alkaline Phosphatase


  117 U/L


() 


  


 


 


Troponin I Quantitative


  1.212 ng/mL


(0.000-0.055) 


  0.887 ng/mL


(0.000-0.055)


 


NT-Pro-B-Type Natriuretic


Peptide 1027 pg/mL


(0-124) 


  


 


 


Total Protein


  8.1 g/dL


(6.4-8.2) 


  


 


 


Albumin


  3.3 g/dL


(3.4-5.0) 


  


 


 


Albumin/Globulin Ratio 0.7 (1.0-1.7)   


 


Glucose (Fingerstick)


  


  185 mg/dL


(70-99) 


 


 


Creatine Kinase


  


  


  431 U/L


()











VTE Prophylaxis Ordered


VTE Prophylaxis Devices:  Yes


VTE Pharmacological Prophylaxi:  Yes











SANDY BASS MD Jun 19, 2017 11:25

## 2017-06-19 NOTE — CARD
--------------- APPROVED REPORT --------------





EXAM: Two-dimensional and M-mode echocardiogram with Doppler and color Doppler.



Other Information 

Quality : GoodHR: 56bpm

Rhythm : Bradycardia



INDICATION

LV Function:Systolic

Elevated troponin



2D DIMENSIONS 

RVDd3.2 (2.9-3.5cm)Left Atrium(2D)3.6 (1.6-4.0cm)

IVSd1.0 (0.7-1.1cm)Aortic Root(2D)3.1 (2.0-3.7cm)

LVDd4.3 (3.9-5.9cm)LVOT Diameter2.3 (1.8-2.4cm)

PWd1.0 (0.7-1.1cm)LVDs3.4 (2.5-4.0cm)

FS (%) 19.7 %SV33.0 ml

LVEF(%)40.8 (>50%)



 LEFT VENTRICLE 

The left ventricle is normal size. There is normal left ventricular wall thickness. The left ventricu
lar systolic function is normal and the ejection fraction is within normal range. The Ejection Fracti
on is 55-60%. There is normal LV segmental wall motion. Limited echo, left ventricular compliance was
 not assessed.



 RIGHT VENTRICLE 

The right ventricle is normal size. There is normal right ventricular wall thickness. The right ventr
icular systolic function is normal.



 ATRIA 

The left atrium is mildly dilated. The right atrium size is normal. The interatrial septum is intact 
with no evidence for an atrial septal defect or patent foramen ovale as noted on 2-D or Doppler imagi
ng.



 GREAT VESSELS 

The aortic root is normal in size.



 PERICARDIAL EFFUSION 

There is no evidence of significant pericardial effusion.



Critical Notification

Critical Value: No



<Conclusion>

The left ventricular systolic function is normal and the ejection fraction is within normal range. Th
e Ejection Fraction is 55-60%.

There is normal LV segmental wall motion.

## 2017-06-19 NOTE — PHYS DOC
Past Medical History


Past Medical History:  Asthma, CAD, Depression, Diabetes-Type II, High 

Cholesterol, Hypertension, Other


Additional Past Medical Histor:  PVD,INSOMNIA,SPINAL STENOSIS, HEP C


Past Surgical History:  Other


Additional Past Surgical Histo:  RIGHT AKA


Alcohol Use:  Sober


Drug Use:  None





Adult General


Chief Complaint


Chief Complaint:  MECHANICAL FALL





HPI


HPI





Patient is a 62  year old male who presents with left leg pain.  The patient is 

unable to give history due to depressed mental status upon arrival.  EMS 

reports patient had fall at nursing home yesterday, reportedly XR performed 

with possible lower leg fracture, no images or report sent.  He was given 

tenazepam, soma, & oxycodone prior to transfer.  He is somnolent upon arrival.





Review of Systems


Review of Systems


unable to obtain due to clinical condition.





Current Medications


Current Medications





Current Medications








 Medications


  (Trade)  Dose


 Ordered  Sig/Shana  Start Time


 Stop Time Status Last Admin


Dose Admin


 


 Fentanyl Citrate


  (Fentanyl 2ml


 Vial)  50 mcg  1X  ONCE  6/19/17 03:00


 6/19/17 03:01 DC 6/19/17 02:48


50 MCG


 


 Naloxone HCl


  (Narcan)  0.8 mg  1X  ONCE  6/19/17 03:00


 6/19/17 03:01 DC 6/19/17 02:00


0.8 MG











Allergies


Allergies





Allergies








Coded Allergies Type Severity Reaction Last Updated Verified


 


  levofloxacin Allergy Intermediate  9/23/14 Yes











Physical Exam


Physical Exam


Constitutional: obese, somnolent, non-toxic appearance. 


HENT: Normocephalic, atraumatic, bilateral external ears normal, oropharynx 

moist, nose normal.


Eyes: PERRLA 3 mm bilaterally, EOMI, conjunctiva normal, no discharge.


Neck: supple, no stridor.  no midline c-spine tenderness


Cardiovascular:  RRR, no murmurs, no edema. 


Lungs & Thorax:  LCTAB, no wheezing, no respiratory distress.  brief periods of 

apnea


Abdomen: soft, nontender, nondistended.


Skin: Warm, dry, no erythema, no rash.


Back: No spinal tenderness.


Extremities: left lower extremity generalized swelling around the ankle & 

distal tib/fib without obvious deformity, diffuse tenderness with palpation in 

the same area, no hip or knee tenderness, limited ROM at ankle secondary to pain

, dp/pt palpable, sensation intact to foot (after narcan).


Neurologic: somnolent, difficult to arouse with sternal rub, answers some 

questions with single word answers


Psychologic: unable to assess due to clinical condition.





Current Patient Data


Vital Signs





 Vital Signs








  Date Time  Temp Pulse Resp B/P (MAP) Pulse Ox O2 Delivery O2 Flow Rate FiO2


 


6/19/17 02:58  68 17 138/63 (88) 95 Nasal Cannula 4.0 








Lab Values





 Laboratory Tests








Test


  6/19/17


02:09


 


White Blood Count


  10.9 x10^3/uL


(4.0-11.0)


 


Red Blood Count


  5.56 x10^6/uL


(4.30-5.70)


 


Hemoglobin


  15.5 g/dL


(13.0-17.5)


 


Hematocrit


  48.7 %


(39.0-53.0)


 


Mean Corpuscular Volume


  88 fL ()


 


 


Mean Corpuscular Hemoglobin 28 pg (25-35)  


 


Mean Corpuscular Hemoglobin


Concent 32 g/dL


(31-37)


 


Red Cell Distribution Width


  16.0 %


(11.5-14.5)  H


 


Platelet Count


  227 x10^3/uL


(140-400)


 


Neutrophils (%) (Auto) 67 % (31-73)  


 


Lymphocytes (%) (Auto) 24 % (24-48)  


 


Monocytes (%) (Auto) 8 % (0-9)  


 


Eosinophils (%) (Auto) 1 % (0-3)  


 


Basophils (%) (Auto) 1 % (0-3)  


 


Neutrophils # (Auto)


  7.3 x10^3uL


(1.8-7.7)


 


Lymphocytes # (Auto)


  2.6 x10^3/uL


(1.0-4.8)


 


Monocytes # (Auto)


  0.9 x10^3/uL


(0.0-1.1)


 


Eosinophils # (Auto)


  0.1 x10^3/uL


(0.0-0.7)


 


Basophils # (Auto)


  0.1 x10^3/uL


(0.0-0.2)


 


Sodium Level


  140 mmol/L


(136-145)


 


Potassium Level


  5.0 mmol/L


(3.5-5.1)


 


Chloride Level


  101 mmol/L


()


 


Carbon Dioxide Level


  36 mmol/L


(21-32)  H


 


Anion Gap 3 (6-14)  L


 


Blood Urea Nitrogen


  17 mg/dL


(8-26)


 


Creatinine


  1.0 mg/dL


(0.7-1.3)


 


Estimated GFR


(Cockcroft-Gault) 75.2  


 


 


BUN/Creatinine Ratio 17 (6-20)  


 


Glucose Level


  135 mg/dL


(70-99)  H


 


Calcium Level


  9.2 mg/dL


(8.5-10.1)


 


Total Bilirubin


  0.3 mg/dL


(0.2-1.0)


 


Aspartate Amino Transferase


(AST) 44 U/L (15-37)


H


 


Alanine Aminotransferase (ALT)


  38 U/L (16-63)


 


 


Alkaline Phosphatase


  117 U/L


()  H


 


Troponin I Quantitative


  1.212 ng/mL


(0.000-0.055)


 


NT-Pro-B-Type Natriuretic


Peptide 1027 pg/mL


(0-124)  H


 


Total Protein


  8.1 g/dL


(6.4-8.2)


 


Albumin


  3.3 g/dL


(3.4-5.0)  L


 


Albumin/Globulin Ratio


  0.7 (1.0-1.7)


L





 Laboratory Tests


6/19/17 02:09








 Laboratory Tests


6/19/17 02:09














EKG


EKG


interpreted by me:  NSR rate 78, no ST elevation, T waves inverted without ST 

depression in leads 2, 3, aVF, also present on EKG dated 5/14/2017, normal 

intervals, no ectopy.[]





Radiology/Procedures


Radiology/Procedures


CXR:  interpreted by me:  cardiomegaly, pulmonary edema, no infiltrate, no 

pneumothorax.


XR L ankle:  interpreted by me:  distal tibia fracture, poorly visualized 


XR L tib/fib:  interpreted by me:  distal tibia fracture, poorly visualized





PROCEDURE: CT LOWER EXTREMITY WO LEFT





INDICATION: TRAUMA, FALL, LEFT ANKLE PAIN


 


COMPARISON: None.


 


TECHNIQUE:


 


Axial CT images obtained through the left ankle.


 


One or more of the following individualized dose reduction techniques were


utilized for this examination:  1. Automated exposure control;  2. 


Adjustment of the mA and/or kV according to patient size;  3. Use of 


iterative reconstruction technique.


 


FINDINGS:


 


There is patchy osseous demineralization seen throughout the osseous 


structures.


Mildly displaced intra-articular fracture of the distal tibia.


Partially visualized degenerative changes of the midfoot.


 


IMPRESSION:


 


Intra-articular fracture of the distal tibia with mild displacement.


 


Patchy osseous demineralization is seen throughout. Could be from benign 


osteopenia but cannot exclude a marrow infiltrative process on this exam.


 


There is edema within the soft tissues most confluent adjacent to the 


tibial fracture which could be seen with a small amount of associated 


blood in the soft tissues.


 


Electronically signed by: Joyce Ball MD (6/19/2017 4:49 AM)














DICTATED and SIGNED BY:     JOYCE BALL MD


DATE:     06/19/17 0419[]





Course & Med Decision Making


Course & Med Decision Making


Pertinent Labs and Imaging studies reviewed. (See chart for details)


The patient arrives by EMS with depressed mental status. He had been awake and 

talkative in the ambulance, fall occurred at 1500, no report of change in 

mental status. Here he was difficult to arouse with sternal rub, having brief 

periods of apnea. Gave oxygen by nasal cannula and he did require bagging at 

times. Administered Narcan and he experienced almost immediate improvement in 

his clinical condition, was awake and answering questions, normal respirations, 

maintaining oxygen saturations.  XR shows tibia fracture but extent of fracture 

is poorly visualized, CT performed here.  Short leg posterior splint placed by 

ED RN, placement confirmed by me, neurovascularly intact after splint 

placement.  Elevated troponin & BNP, no STEMI, discussed with Dr. Church, 

will give aspirin & lasix, obtain repeat troponin/EKG.  I did not initially 

order a head CT due to his immediate improvement from narcan, but have added 

this study at time of admission to evaluate for possible intracranial injury.  

Recommended admission to the hospital for further evaluation & treatment.  I 

discussed with Dr. Curran who agrees to admit to inpatient status, ortho 

consult to Dr. Herr.  The patient is being admitted in stable & improved 

condition.  


[]





Dragon Disclaimer


Dragon Disclaimer


This electronic medical record was generated, in whole or in part, using a 

voice recognition dictation system.





Departure


Departure


Impression:  


 Primary Impression:  


 CHF (congestive heart failure)


 Additional Impressions:  


 Elevated troponin


 Opiate overdose


 Fracture of distal end of tibia


Disposition:  09 ADMITTED AS INPATIENT


Admitting Physician:  Akash Curran


Condition:  IMPROVED


Referrals:  


DUDLEY MCKEON (PCP)





Problem Qualifiers











JODIE KASPER MD Jun 19, 2017 05:06

## 2017-06-19 NOTE — EKG
[facility]

              [address] [place] [address]

Test Date:    2017               Test Time:    02:27:33

Pat Name:     ZUNILDA MCMANUS              Department:   

Patient ID:   [id number]           Room:         201 1

Gender:       M                        Technician:   

:          [id number]               Requested By: JODIE KASPER

Order Number: [id number]            Reading MD:   Ankush Rodriguez

                                 Measurements

Intervals                              Axis          

Rate:         78                       P:            47

PA:           194                      QRS:          -13

QRSD:         88                       T:            28

QT:           376                                    

QTc:          432                                    

                           Interpretive Statements

SINUS RHYTHM

LEFTWARD AXIS

NONSPECIFIC ST-T WAVE CHANGES.

RI6.01          Unconfirmed report



Electronically Signed On 2017 11:09:37 CDT by Ankush Rodriguez

## 2017-06-19 NOTE — RAD
Indication pain associated with a fall.



AP views of the chest were obtained and are compared to an examination

5/13/2017



Heart size is unchanged. There are suspect background changes of moderate

pulmonary vascular congestion. There is no consolidated pneumonia. Significant

pleural fluid is not seen. There is no pneumothorax. Postoperative changes are

noted associated with the right clavicle.



IMPRESSION: No focal process seen.

                          Probable moderate pulmonary vascular congestion

## 2017-06-19 NOTE — PDOC2
CARDIAC CONSULT


DATE OF CONSULT


Date of Consult


DATE: 6/19/17 


TIME: 09:19





REASON FOR CONSULT


Reason for Consult:


Elevated troponin, CHF





REFERRING PHYSICIAN


Referring Physician:


Niesha





SOURCE


Source:  Chart review, Patient





HISTORY OF PRESENT ILLNESS


HISTORY OF PRESENT ILLNESS


This is a pleasant 63 yo male admitted for complains of fall. Reports that he 

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 

fracture which is nonsurgical accdg to ortho.  There was no symptoms of SOA, CP

, nor palpitations.  Pt reports of CAD. Presently denies any discomfort.  Prior 

to his fall he has not been having any symptoms. He did have an abnormal MPI in 

the past which was elected to be treated medically.





PAST MEDICAL HISTORY


Cardiovascular:  CAD, HTN, Hyperlipidemia, Other (PAD)


Pulmonary:  Asthma


Psych:  Depression


Musculoskeletal:   low back pain, Osteoarthritis, Other (falls)


Endocrine:  Diabetes (2)


Dermatology:  Other (left heel ulcer)





PAST SURGICAL HISTORY


Past Surgical History:  Other (bilateral fempop; RAKA)





FAMILY HISTORY


Family History:  Diabetes, Hypertension





SOCIAL HISTORY


Smoke:  No


ALCOHOL:  none


Drugs:  None


Lives:  Nursing Home





CURRENT MEDICATIONS


CURRENT MEDICATIONS





Current Medications








 Medications


  (Trade)  Dose


 Ordered  Sig/Shana


 Route


 PRN Reason  Start Time


 Stop Time Status Last Admin


Dose Admin


 


 Naloxone HCl


  (Narcan)  0.8 mg  1X  ONCE


 IM


   6/19/17 03:00


 6/19/17 03:01 DC 6/19/17 02:00


 


 


 Fentanyl Citrate


  (Fentanyl 2ml


 Vial)  50 mcg  1X  ONCE


 IV


   6/19/17 03:00


 6/19/17 03:01 DC 6/19/17 02:48


 


 


 Furosemide


  (Lasix)  40 mg  1X  ONCE


 IVP


   6/19/17 03:30


 6/19/17 03:31 DC 6/19/17 03:45


 


 


 Aspirin


  (Bj Aspirin)  325 mg  1X  ONCE


 PO


   6/19/17 03:30


 6/19/17 03:31 DC 6/19/17 03:45


 


 


 Fentanyl Citrate


  (Fentanyl 2ml


 Vial)  50 mcg  PRN Q15MIN  PRN


 IV


 PAIN GREATER THAN 3/10  6/19/17 03:15


 6/20/17 03:14  6/19/17 05:59


 


 


 Morphine Sulfate  2 mg  PRN Q2HR  PRN


 IV


 SEVERE PAIN  6/19/17 03:15


 6/20/17 03:14  6/19/17 07:31


 











ALLERGIES


ALLERGIES:  


Coded Allergies:  


     levofloxacin (Verified  Allergy, Intermediate, 9/23/14)





ROS


Review of System


14 point ROS evaluated with pertinent positives noted per HPI





PHYSICAL EXAM


General:  Alert, Oriented X3, Cooperative, No acute distress


HEENT:  Atraumatic, Mucous membr. moist/pink


Lungs:  Other (diminished bases)


Heart:  Regular rate, Normal S1, Normal S2, No murmurs


Abdomen:  Soft, No tenderness


Extremities:  No cyanosis, Other (unable to asses LLE due to leg wrap and 

splint but positive for sensation, mobility, toes warm to touch. )


Neuro:  Normal speech, Sensation intact


Psych/Mental Status:  Mental status NL, Mood NL


MUSCULOSKELETAL:  Osteoarthritic changes both hands





VITALS


VITALS





Vital Signs








  Date Time  Temp Pulse Resp B/P (MAP) Pulse Ox O2 Delivery O2 Flow Rate FiO2


 


6/19/17 08:01   16  93 Nasal Cannula 4.0 


 


6/19/17 07:59 98.7 69  108/57 (74)    





 98.7       











LABS


Lab:





Laboratory Tests








Test


  6/19/17


02:09 6/19/17


07:44


 


White Blood Count


  10.9 x10^3/uL


(4.0-11.0) 


 


 


Red Blood Count


  5.56 x10^6/uL


(4.30-5.70) 


 


 


Hemoglobin


  15.5 g/dL


(13.0-17.5) 


 


 


Hematocrit


  48.7 %


(39.0-53.0) 


 


 


Mean Corpuscular Volume 88 fL ()  


 


Mean Corpuscular Hemoglobin 28 pg (25-35)  


 


Mean Corpuscular Hemoglobin


Concent 32 g/dL


(31-37) 


 


 


Red Cell Distribution Width


  16.0 %


(11.5-14.5) 


 


 


Platelet Count


  227 x10^3/uL


(140-400) 


 


 


Neutrophils (%) (Auto) 67 % (31-73)  


 


Lymphocytes (%) (Auto) 24 % (24-48)  


 


Monocytes (%) (Auto) 8 % (0-9)  


 


Eosinophils (%) (Auto) 1 % (0-3)  


 


Basophils (%) (Auto) 1 % (0-3)  


 


Neutrophils # (Auto)


  7.3 x10^3uL


(1.8-7.7) 


 


 


Lymphocytes # (Auto)


  2.6 x10^3/uL


(1.0-4.8) 


 


 


Monocytes # (Auto)


  0.9 x10^3/uL


(0.0-1.1) 


 


 


Eosinophils # (Auto)


  0.1 x10^3/uL


(0.0-0.7) 


 


 


Basophils # (Auto)


  0.1 x10^3/uL


(0.0-0.2) 


 


 


Sodium Level


  140 mmol/L


(136-145) 


 


 


Potassium Level


  5.0 mmol/L


(3.5-5.1) 


 


 


Chloride Level


  101 mmol/L


() 


 


 


Carbon Dioxide Level


  36 mmol/L


(21-32) 


 


 


Anion Gap 3 (6-14)  


 


Blood Urea Nitrogen


  17 mg/dL


(8-26) 


 


 


Creatinine


  1.0 mg/dL


(0.7-1.3) 


 


 


Estimated GFR


(Cockcroft-Gault) 75.2 


  


 


 


BUN/Creatinine Ratio 17 (6-20)  


 


Glucose Level


  135 mg/dL


(70-99) 


 


 


Calcium Level


  9.2 mg/dL


(8.5-10.1) 


 


 


Total Bilirubin


  0.3 mg/dL


(0.2-1.0) 


 


 


Aspartate Amino Transf


(AST/SGOT) 44 U/L (15-37) 


  


 


 


Alanine Aminotransferase


(ALT/SGPT) 38 U/L (16-63) 


  


 


 


Alkaline Phosphatase


  117 U/L


() 


 


 


Troponin I Quantitative


  1.212 ng/mL


(0.000-0.055) 


 


 


NT-Pro-B-Type Natriuretic


Peptide 1027 pg/mL


(0-124) 


 


 


Total Protein


  8.1 g/dL


(6.4-8.2) 


 


 


Albumin


  3.3 g/dL


(3.4-5.0) 


 


 


Albumin/Globulin Ratio 0.7 (1.0-1.7)  


 


Glucose (Fingerstick)


  


  185 mg/dL


(70-99)











ECHOCARDIOGRAM


ECHOCARDIOGRAM


<Conclusion>


The left ventricular systolic function is normal and the ejection fraction is 

within normal range. The Ejection Fraction is 60-65%.


There is normal LV segmental wall motion.





DATE:     05/15/17 1705





STRESS TEST


STRESS TEST


REGADENOSON MPI DONE 2/25/2015: 


   MODERATE SIZED, MODERATE TO SEVERE INTENSITY MIXED BUT PREDOMINANTLY 

REVERSIBLE DEFECT INVOLVING BASAL 


   TO DISTAL ANTERIOR, INFEROLATERAL AND PORTIONS OF ANTEROLATERAL WALL 

CONSISTENT WITH ISCHEMIA





   Medical management advised at that from [facility] but failed to follow up after.





ASSESSMENT/PLAN


ASSESSMENT/PLAN


1. Traumatic fall: left tibial fracture. Fell over asleep from electric [employer]. 


2. Suspecting Rhabdomyolysis


3. NSTEMI: initial troponin 1.2, no significant changes with EKG by comparison.

  Likely type 2. No cardiac symptoms 


4. Acute on chronic diastolic CHF: Lasix IV received. Appears better


5. CAD: PCI/stent in past, unknown date. 


6. PAD: fempop in the past and RAKA


7. HTN: controlled


8. HLP: HDL low otherwise close to goal from recent lipids. 


9. DM2


10. UTI





Recommendations


1. Limited TTE, continue with troponin series, check CK


2. Pt does not want any invasive cardiac testing after significant discussion. 

Will treat medically


3. Continue with secondary prevention. ASA/plavix


4. Strict I & O. Resume norvasc, atenolol, hold losartan for now per BP 

tolerance. 


5. Ortho consult pending


Problems:  











NANCY HURTADO APRN Jun 19, 2017 09:55

## 2017-06-19 NOTE — HP
ADMIT DATE:  06/19/2017



CHIEF COMPLAINT:  Mechanical fall.



HISTORY OF PRESENT ILLNESS:  This 62-year-old male patient was brought to the

hospital for a left leg pain.  Reportedly, the patient had some depressed mental

state on arrival.  It could be due to home medications.  The patient received

some Narcan and upon receiving of Narcan, his mental status improved.  At the

time of my examination, he is able to have good conversations but he is very 
slow to respond.  The

patient has mild elevation of troponins and has left ankle fracture for which 
he was

brought into the hospital.  This morning the patient's pain which has been

controlled and 6/10, 



PAST MEDICAL HISTORY:  Asthma, coronary artery disease, depression, type 2

diabetes mellitus, hyperlipidemia, hypertension, peripheral artery disease,

insomnia, spinal stenosis, and hepatitis B.



PAST SURGICAL HISTORY:  Right AKA.



PERSONAL HISTORY:  No alcohol, no drug use



REVIEW OF SYSTEMS:  Please see my electronic H and P.



ALLERGIES:  LEVAQUIN.



PHYSICAL EXAMINATION:  Please see my electronic H and P.



LABORATORY DATA:  CBC within normal limits.  Chemistry; troponins 

and second was 0.087, sodium 140, potassium 5, chloride 101, and carbon dioxide





IMAGING DATA:  CT of the head, no acute intracranial process seen.



Lower extremity CT, intact fraction of the distal tibia with mild displacement. 

  Tibia fibula x-ray, mild fracture of the distal tibia.



ASSESSMENT AND PLAN:

1.  Distal tibial fracture in remission.

2.  Status post mechanical fall.

3.  Mild elevation of troponin, N-STEMI.

4.  Hyperglycemia, type 2 diabetes mellitus.

5.  AMS duet o medications, narcotics, resolved. 

6.  Right AKA.

7.  Depression.

8.  Hyperlipidemia.

9.  Hypertension.



PLAN:  The patient has been admitted to the hospital and cardiology has been

consulted, but patient declined to do any further intervention. 

Suggested stress test or cardiac catheterization, which has been discussed with

the cardiologist.  Sliding scale insulin for hyperglycemia.  avoid co2

narcosis.  P.r.n. albuterol.  Increase Lasix.  Home medication reviewed and

counselled.  Orthopedics has been consulted.need SNU replacement.  Overall

prognosis guarded.  Continue telemetry.  Physical therapy and occupation

therapy.  DVT prophylaxis with Lovenox.

iv Hydration, labs ordered for CPK and myoglobin. 



 



______________________________

SANDY BASS MD



DR:  LUIS/digna  JOB#:  [id number] / [id number]

DD:  06/19/2017 14:46  DT:  06/19/2017 19:58

YASMIN

## 2017-06-19 NOTE — RAD
Indication pain associated with a fall.



AP and lateral views of the left tibia and fibula were obtained as well as AP

oblique and lateral views of the left ankle.



There is probable bony demineralization. There is a mildly distracted fracture

involving the tibial diaphysis. The fibula appears unremarkable.



IMPRESSION: Mildly distracted traumatic fracture of the distal tibia

## 2017-06-20 VITALS — DIASTOLIC BLOOD PRESSURE: 45 MMHG | SYSTOLIC BLOOD PRESSURE: 114 MMHG

## 2017-06-20 VITALS — SYSTOLIC BLOOD PRESSURE: 121 MMHG | DIASTOLIC BLOOD PRESSURE: 66 MMHG

## 2017-06-20 VITALS — SYSTOLIC BLOOD PRESSURE: 141 MMHG | DIASTOLIC BLOOD PRESSURE: 73 MMHG

## 2017-06-20 VITALS — SYSTOLIC BLOOD PRESSURE: 127 MMHG | DIASTOLIC BLOOD PRESSURE: 56 MMHG

## 2017-06-20 VITALS — DIASTOLIC BLOOD PRESSURE: 67 MMHG | SYSTOLIC BLOOD PRESSURE: 132 MMHG

## 2017-06-20 VITALS — SYSTOLIC BLOOD PRESSURE: 124 MMHG | DIASTOLIC BLOOD PRESSURE: 61 MMHG

## 2017-06-20 LAB
ANION GAP SERPL CALC-SCNC: 3 MMOL/L (ref 6–14)
BASOPHILS # BLD AUTO: 0.1 X10^3/UL (ref 0–0.2)
BASOPHILS NFR BLD: 1 % (ref 0–3)
BUN SERPL-MCNC: 14 MG/DL (ref 8–26)
CALCIUM SERPL-MCNC: 8.9 MG/DL (ref 8.5–10.1)
CHLORIDE SERPL-SCNC: 101 MMOL/L (ref 98–107)
CO2 SERPL-SCNC: 38 MMOL/L (ref 21–32)
CREAT SERPL-MCNC: 0.9 MG/DL (ref 0.7–1.3)
EOSINOPHIL NFR BLD: 2 % (ref 0–3)
ERYTHROCYTE [DISTWIDTH] IN BLOOD BY AUTOMATED COUNT: 15.9 % (ref 11.5–14.5)
GFR SERPLBLD BASED ON 1.73 SQ M-ARVRAT: 85 ML/MIN
GLUCOSE SERPL-MCNC: 147 MG/DL (ref 70–99)
HCT VFR BLD CALC: 44.8 % (ref 39–53)
HGB BLD-MCNC: 14.5 G/DL (ref 13–17.5)
LYMPHOCYTES # BLD: 2.2 X10^3/UL (ref 1–4.8)
LYMPHOCYTES NFR BLD AUTO: 27 % (ref 24–48)
MCH RBC QN AUTO: 28 PG (ref 25–35)
MCHC RBC AUTO-ENTMCNC: 32 G/DL (ref 31–37)
MCV RBC AUTO: 86 FL (ref 79–100)
MONOCYTES NFR BLD: 11 % (ref 0–9)
NEUTROPHILS NFR BLD AUTO: 61 % (ref 31–73)
PLATELET # BLD AUTO: 209 X10^3/UL (ref 140–400)
POTASSIUM SERPL-SCNC: 3.7 MMOL/L (ref 3.5–5.1)
RBC # BLD AUTO: 5.18 X10^6/UL (ref 4.3–5.7)
SODIUM SERPL-SCNC: 142 MMOL/L (ref 136–145)
WBC # BLD AUTO: 8.4 X10^3/UL (ref 4–11)

## 2017-06-20 RX ADMIN — INSULIN ASPART SCH UNITS: 100 INJECTION, SOLUTION INTRAVENOUS; SUBCUTANEOUS at 07:30

## 2017-06-20 RX ADMIN — TEMAZEPAM SCH MG: 15 CAPSULE ORAL at 22:15

## 2017-06-20 RX ADMIN — BACLOFEN SCH MG: 10 TABLET ORAL at 22:16

## 2017-06-20 RX ADMIN — ESCITALOPRAM OXALATE SCH MG: 10 TABLET ORAL at 22:15

## 2017-06-20 RX ADMIN — SILVER SULFADIAZINE SCH APP: 10 CREAM TOPICAL at 09:00

## 2017-06-20 RX ADMIN — ASPIRIN SCH MG: 81 TABLET, COATED ORAL at 09:11

## 2017-06-20 RX ADMIN — INSULIN ASPART SCH UNITS: 100 INJECTION, SOLUTION INTRAVENOUS; SUBCUTANEOUS at 11:30

## 2017-06-20 RX ADMIN — HYDROCODONE BITARTRATE AND ACETAMINOPHEN PRN TAB: 5; 325 TABLET ORAL at 09:13

## 2017-06-20 RX ADMIN — ATENOLOL SCH MG: 50 TABLET ORAL at 09:14

## 2017-06-20 RX ADMIN — POTASSIUM CHLORIDE SCH MEQ: 750 TABLET, FILM COATED, EXTENDED RELEASE ORAL at 09:13

## 2017-06-20 RX ADMIN — INSULIN ASPART SCH UNITS: 100 INJECTION, SOLUTION INTRAVENOUS; SUBCUTANEOUS at 18:02

## 2017-06-20 RX ADMIN — BACLOFEN SCH MG: 10 TABLET ORAL at 09:14

## 2017-06-20 RX ADMIN — INSULIN DETEMIR SCH UNITS: 100 INJECTION, SOLUTION SUBCUTANEOUS at 12:43

## 2017-06-20 RX ADMIN — LOSARTAN POTASSIUM SCH MG: 50 TABLET ORAL at 09:00

## 2017-06-20 RX ADMIN — FUROSEMIDE SCH MG: 40 TABLET ORAL at 09:14

## 2017-06-20 RX ADMIN — ATORVASTATIN CALCIUM SCH MG: 40 TABLET, FILM COATED ORAL at 22:16

## 2017-06-20 RX ADMIN — ENOXAPARIN SODIUM SCH MG: 40 INJECTION SUBCUTANEOUS at 14:53

## 2017-06-20 RX ADMIN — CLOPIDOGREL BISULFATE SCH MG: 75 TABLET ORAL at 09:14

## 2017-06-20 NOTE — PDOC
ORTHO PROGRESS NOTES


Subjective


Unable to get boot yesterday. Patient denies any change in his pain.


Vitals





Vital Signs








  Date Time  Temp Pulse Resp B/P (MAP) Pulse Ox O2 Delivery O2 Flow Rate FiO2


 


6/20/17 09:14  66  114/45    


 


6/20/17 09:13     90 Nasal Cannula 3.0 


 


6/20/17 07:00 98.1  18     





 98.1       








Labs





Laboratory Tests








Test


  6/19/17


02:09 6/19/17


07:44 6/19/17


09:05 6/19/17


11:30


 


White Blood Count


  10.9 x10^3/uL


(4.0-11.0) 


  


  


 


 


Red Blood Count


  5.56 x10^6/uL


(4.30-5.70) 


  


  


 


 


Hemoglobin


  15.5 g/dL


(13.0-17.5) 


  


  


 


 


Hematocrit


  48.7 %


(39.0-53.0) 


  


  


 


 


Mean Corpuscular Volume 88 fL ()    


 


Mean Corpuscular Hemoglobin 28 pg (25-35)    


 


Mean Corpuscular Hemoglobin


Concent 32 g/dL


(31-37) 


  


  


 


 


Red Cell Distribution Width


  16.0 %


(11.5-14.5) 


  


  


 


 


Platelet Count


  227 x10^3/uL


(140-400) 


  


  


 


 


Neutrophils (%) (Auto) 67 % (31-73)    


 


Lymphocytes (%) (Auto) 24 % (24-48)    


 


Monocytes (%) (Auto) 8 % (0-9)    


 


Eosinophils (%) (Auto) 1 % (0-3)    


 


Basophils (%) (Auto) 1 % (0-3)    


 


Neutrophils # (Auto)


  7.3 x10^3uL


(1.8-7.7) 


  


  


 


 


Lymphocytes # (Auto)


  2.6 x10^3/uL


(1.0-4.8) 


  


  


 


 


Monocytes # (Auto)


  0.9 x10^3/uL


(0.0-1.1) 


  


  


 


 


Eosinophils # (Auto)


  0.1 x10^3/uL


(0.0-0.7) 


  


  


 


 


Basophils # (Auto)


  0.1 x10^3/uL


(0.0-0.2) 


  


  


 


 


Sodium Level


  140 mmol/L


(136-145) 


  


  


 


 


Potassium Level


  5.0 mmol/L


(3.5-5.1) 


  


  


 


 


Chloride Level


  101 mmol/L


() 


  


  


 


 


Carbon Dioxide Level


  36 mmol/L


(21-32) 


  


  


 


 


Anion Gap 3 (6-14)    


 


Blood Urea Nitrogen


  17 mg/dL


(8-26) 


  


  


 


 


Creatinine


  1.0 mg/dL


(0.7-1.3) 


  


  


 


 


Estimated GFR


(Cockcroft-Gault) 75.2 


  


  


  


 


 


BUN/Creatinine Ratio 17 (6-20)    


 


Glucose Level


  135 mg/dL


(70-99) 


  


  


 


 


Calcium Level


  9.2 mg/dL


(8.5-10.1) 


  


  


 


 


Total Bilirubin


  0.3 mg/dL


(0.2-1.0) 


  


  


 


 


Aspartate Amino Transf


(AST/SGOT) 44 U/L (15-37) 


  


  


  


 


 


Alanine Aminotransferase


(ALT/SGPT) 38 U/L (16-63) 


  


  


  


 


 


Alkaline Phosphatase


  117 U/L


() 


  


  


 


 


Troponin I Quantitative


  1.212 ng/mL


(0.000-0.055) 


  0.887 ng/mL


(0.000-0.055) 


 


 


NT-Pro-B-Type Natriuretic


Peptide 1027 pg/mL


(0-124) 


  


  


 


 


Total Protein


  8.1 g/dL


(6.4-8.2) 


  


  


 


 


Albumin


  3.3 g/dL


(3.4-5.0) 


  


  


 


 


Albumin/Globulin Ratio 0.7 (1.0-1.7)    


 


Glucose (Fingerstick)


  


  185 mg/dL


(70-99) 


  185 mg/dL


(70-99)


 


Creatine Kinase


  


  


  431 U/L


() 


 


 


Myoglobin


  


  


  159 ng/mL


(16-96) 


 


 


Test


  6/19/17


14:50 6/19/17


16:51 6/19/17


20:48 6/20/17


04:05


 


Troponin I Quantitative


  0.761 ng/mL


(0.000-0.055) 


  


  


 


 


Glucose (Fingerstick)


  


  173 mg/dL


(70-99) 180 mg/dL


(70-99) 


 


 


White Blood Count


  


  


  


  8.4 x10^3/uL


(4.0-11.0)


 


Red Blood Count


  


  


  


  5.18 x10^6/uL


(4.30-5.70)


 


Hemoglobin


  


  


  


  14.5 g/dL


(13.0-17.5)


 


Hematocrit


  


  


  


  44.8 %


(39.0-53.0)


 


Mean Corpuscular Volume    86 fL () 


 


Mean Corpuscular Hemoglobin    28 pg (25-35) 


 


Mean Corpuscular Hemoglobin


Concent 


  


  


  32 g/dL


(31-37)


 


Red Cell Distribution Width


  


  


  


  15.9 %


(11.5-14.5)


 


Platelet Count


  


  


  


  209 x10^3/uL


(140-400)


 


Neutrophils (%) (Auto)    61 % (31-73) 


 


Lymphocytes (%) (Auto)    27 % (24-48) 


 


Monocytes (%) (Auto)    11 % (0-9) 


 


Eosinophils (%) (Auto)    2 % (0-3) 


 


Basophils (%) (Auto)    1 % (0-3) 


 


Neutrophils # (Auto)


  


  


  


  5.1 x10^3uL


(1.8-7.7)


 


Lymphocytes # (Auto)


  


  


  


  2.2 x10^3/uL


(1.0-4.8)


 


Monocytes # (Auto)


  


  


  


  0.9 x10^3/uL


(0.0-1.1)


 


Eosinophils # (Auto)


  


  


  


  0.1 x10^3/uL


(0.0-0.7)


 


Basophils # (Auto)


  


  


  


  0.1 x10^3/uL


(0.0-0.2)


 


Sodium Level


  


  


  


  142 mmol/L


(136-145)


 


Potassium Level


  


  


  


  3.7 mmol/L


(3.5-5.1)


 


Chloride Level


  


  


  


  101 mmol/L


()


 


Carbon Dioxide Level


  


  


  


  38 mmol/L


(21-32)


 


Anion Gap    3 (6-14) 


 


Blood Urea Nitrogen


  


  


  


  14 mg/dL


(8-26)


 


Creatinine


  


  


  


  0.9 mg/dL


(0.7-1.3)


 


Estimated GFR


(Cockcroft-Gault) 


  


  


  85.0 


 


 


Glucose Level


  


  


  


  147 mg/dL


(70-99)


 


Calcium Level


  


  


  


  8.9 mg/dL


(8.5-10.1)


 


Test


  6/20/17


08:34 


  


  


 


 


Glucose (Fingerstick)


  136 mg/dL


(70-99) 


  


  


 








Laboratory Tests








Test


  6/19/17


11:30 6/19/17


14:50 6/19/17


16:51 6/19/17


20:48


 


Glucose (Fingerstick)


  185 mg/dL


(70-99) 


  173 mg/dL


(70-99) 180 mg/dL


(70-99)


 


Troponin I Quantitative


  


  0.761 ng/mL


(0.000-0.055) 


  


 


 


Test


  6/20/17


04:05 6/20/17


08:34 


  


 


 


White Blood Count


  8.4 x10^3/uL


(4.0-11.0) 


  


  


 


 


Red Blood Count


  5.18 x10^6/uL


(4.30-5.70) 


  


  


 


 


Hemoglobin


  14.5 g/dL


(13.0-17.5) 


  


  


 


 


Hematocrit


  44.8 %


(39.0-53.0) 


  


  


 


 


Mean Corpuscular Volume 86 fL ()    


 


Mean Corpuscular Hemoglobin 28 pg (25-35)    


 


Mean Corpuscular Hemoglobin


Concent 32 g/dL


(31-37) 


  


  


 


 


Red Cell Distribution Width


  15.9 %


(11.5-14.5) 


  


  


 


 


Platelet Count


  209 x10^3/uL


(140-400) 


  


  


 


 


Neutrophils (%) (Auto) 61 % (31-73)    


 


Lymphocytes (%) (Auto) 27 % (24-48)    


 


Monocytes (%) (Auto) 11 % (0-9)    


 


Eosinophils (%) (Auto) 2 % (0-3)    


 


Basophils (%) (Auto) 1 % (0-3)    


 


Neutrophils # (Auto)


  5.1 x10^3uL


(1.8-7.7) 


  


  


 


 


Lymphocytes # (Auto)


  2.2 x10^3/uL


(1.0-4.8) 


  


  


 


 


Monocytes # (Auto)


  0.9 x10^3/uL


(0.0-1.1) 


  


  


 


 


Eosinophils # (Auto)


  0.1 x10^3/uL


(0.0-0.7) 


  


  


 


 


Basophils # (Auto)


  0.1 x10^3/uL


(0.0-0.2) 


  


  


 


 


Sodium Level


  142 mmol/L


(136-145) 


  


  


 


 


Potassium Level


  3.7 mmol/L


(3.5-5.1) 


  


  


 


 


Chloride Level


  101 mmol/L


() 


  


  


 


 


Carbon Dioxide Level


  38 mmol/L


(21-32) 


  


  


 


 


Anion Gap 3 (6-14)    


 


Blood Urea Nitrogen


  14 mg/dL


(8-26) 


  


  


 


 


Creatinine


  0.9 mg/dL


(0.7-1.3) 


  


  


 


 


Estimated GFR


(Cockcroft-Gault) 85.0 


  


  


  


 


 


Glucose Level


  147 mg/dL


(70-99) 


  


  


 


 


Calcium Level


  8.9 mg/dL


(8.5-10.1) 


  


  


 


 


Glucose (Fingerstick)


  


  136 mg/dL


(70-99) 


  


 








Notes


He is resting, easily awakens. Examination of his left lower extremity reveals 

continued tenderness over his distal tibia region. Compartments are soft. No 

pain with passive range of motion. Still has edema present, unchanged from 

prior encounter.


Assessment and Plan


He'll be nonweightbearing 6 weeks. He should remain in the walking boot, okay 

to remove her one to 2 hours per day while at rest.











TYRELL CLAY II, MD Jun 20, 2017 11:06

## 2017-06-20 NOTE — CONS
DATE OF CONSULTATION:  06/19/2017



REFERRING PROVIDER:  Cherise Scherer MD.



CONSULTING PROVIDER:  Pablo Clay MD



REASON FOR CONSULTATION:  Left distal tibia fracture.



CHIEF COMPLAINT:  Left leg pain.



HISTORY OF PRESENT ILLNESS:  The patient is a very pleasant 62-year-old

gentleman who presented to our facility after he fell yesterday and had

inability to bear weight and pain in his left lower extremity.  He tells me his

pain has been quite sore, but not progressive.  He feels the pain in his leg. 

It is worse with any attempted movement of his leg.  Denies any abnormal

sensation.  Denies pain anywhere else.



REVIEW OF SYSTEMS:  Negative except as per HPI.



ALLERGIES:  LEVOFLOXACIN.



MEDICATIONS:  Reviewed, please see MRAD.



PAST MEDICAL HISTORY:  Asthma, coronary artery disease, depression, right above

knee amputation, type 2 diabetes, hypercholesterolemia, hypertension, peripheral

vascular disease, hepatitis C, spinal stenosis.



SOCIAL HISTORY:  No alcohol or tobacco.



FAMILY HISTORY:  Noncontributory.



PHYSICAL EXAMINATION:

GENERAL:  The patient is alert.  He is in mild amount of acute distress

secondary to pain.  He answers and asks questions appropriately.

HEENT:  Head normocephalic, atraumatic.  Extraocular muscles are intact.

CARDIOVASCULAR:  Regular rate and rhythm, dorsalis pedis 1+ on the left.

LUNGS:  Respirations are unlabored.  He has symmetric chest raise.

ABDOMEN:  Soft, nondistended.

EXTREMITIES:  Examination of bilateral lower extremities reveals an above knee

amputation without any complication at this time on the right side.  Examination

of his left lower extremity reveals edema at his foot and ankle up to his mid

tibial region.  All compartments are soft and easily compressible.  No pain with

passive range of motion at great toe.  He has pain at his ankle with ankle range

of motion.  Normal sensation of left forefoot.  No tenderness around his knee.



IMAGING:  X-rays are interpreted by myself.  CT is interrupted by myself.  The

report for these studies also reviewed as well as the tib-fib x-ray.  He has a

nondisplaced intraarticular distal tibia fracture.



IMPRESSION:  Closed left nondisplaced intraarticular distal tibia fracture.



PLAN:  From my standpoint, he needs to be nonweightbearing left lower extremity.

 We will ask  to place him into a Cam boot.  He also had an elevated

troponin and Cardiology is evaluating him.  I would anticipate he would need

placement secondary to his mobility that this injury will impose.

 



______________________________

PABLO CLAY MD



DR:  MATT/digna  JOB#:  [id number] / [id number]

DD:  06/19/2017 10:30  DT:  06/20/2017 04:59

YASMIN

## 2017-06-20 NOTE — PDOC
CARDIO Progress Notes


Date and Time


Date of Service


6/20/2017


Time of Evaluation


1030





Subjective


Subjective:  No Chest Pain, No shortness of breath, No Palpitations, No 

Dizziness, Other (complians of LLE pain from fracture)





Vitals


Vitals





Vital Signs








  Date Time  Temp Pulse Resp B/P (MAP) Pulse Ox O2 Delivery O2 Flow Rate FiO2


 


6/20/17 09:14  66  114/45    


 


6/20/17 09:13     90 Nasal Cannula 3.0 


 


6/20/17 07:00 98.1  18     





 98.1       








Weight


Weight [ ]





Input and Output


Intake and Output











Intake and Output 


 


 6/20/17





 07:00


 


Intake Total 340 ml


 


Output Total 2800 ml


 


Balance -2460 ml


 


 


 


Intake Oral 340 ml


 


Output Urine Total 2800 ml











Laboratory


Labs





Laboratory Tests








Test


  6/19/17


11:30 6/19/17


14:50 6/19/17


16:51 6/19/17


20:48


 


Glucose (Fingerstick)


  185 mg/dL


(70-99) 


  173 mg/dL


(70-99) 180 mg/dL


(70-99)


 


Troponin I Quantitative


  


  0.761 ng/mL


(0.000-0.055) 


  


 


 


Test


  6/20/17


04:05 6/20/17


08:34 


  


 


 


White Blood Count


  8.4 x10^3/uL


(4.0-11.0) 


  


  


 


 


Red Blood Count


  5.18 x10^6/uL


(4.30-5.70) 


  


  


 


 


Hemoglobin


  14.5 g/dL


(13.0-17.5) 


  


  


 


 


Hematocrit


  44.8 %


(39.0-53.0) 


  


  


 


 


Mean Corpuscular Volume 86 fL ()    


 


Mean Corpuscular Hemoglobin 28 pg (25-35)    


 


Mean Corpuscular Hemoglobin


Concent 32 g/dL


(31-37) 


  


  


 


 


Red Cell Distribution Width


  15.9 %


(11.5-14.5) 


  


  


 


 


Platelet Count


  209 x10^3/uL


(140-400) 


  


  


 


 


Neutrophils (%) (Auto) 61 % (31-73)    


 


Lymphocytes (%) (Auto) 27 % (24-48)    


 


Monocytes (%) (Auto) 11 % (0-9)    


 


Eosinophils (%) (Auto) 2 % (0-3)    


 


Basophils (%) (Auto) 1 % (0-3)    


 


Neutrophils # (Auto)


  5.1 x10^3uL


(1.8-7.7) 


  


  


 


 


Lymphocytes # (Auto)


  2.2 x10^3/uL


(1.0-4.8) 


  


  


 


 


Monocytes # (Auto)


  0.9 x10^3/uL


(0.0-1.1) 


  


  


 


 


Eosinophils # (Auto)


  0.1 x10^3/uL


(0.0-0.7) 


  


  


 


 


Basophils # (Auto)


  0.1 x10^3/uL


(0.0-0.2) 


  


  


 


 


Sodium Level


  142 mmol/L


(136-145) 


  


  


 


 


Potassium Level


  3.7 mmol/L


(3.5-5.1) 


  


  


 


 


Chloride Level


  101 mmol/L


() 


  


  


 


 


Carbon Dioxide Level


  38 mmol/L


(21-32) 


  


  


 


 


Anion Gap 3 (6-14)    


 


Blood Urea Nitrogen


  14 mg/dL


(8-26) 


  


  


 


 


Creatinine


  0.9 mg/dL


(0.7-1.3) 


  


  


 


 


Estimated GFR


(Cockcroft-Gault) 85.0 


  


  


  


 


 


Glucose Level


  147 mg/dL


(70-99) 


  


  


 


 


Calcium Level


  8.9 mg/dL


(8.5-10.1) 


  


  


 


 


Glucose (Fingerstick)


  


  136 mg/dL


(70-99) 


  


 











Physical Exam


HEENT:  Neck Supple W Full Motion


Chest:  Symmetric


LUNGS:  Clear to Auscultation


Heart:  S1S2, RRR (SR)


Abdomen:  Soft N/T, Other (truncal obesity)


Extremities:  Other (neurovascular status to LLE intact)


Neurology:  alert, oriented, follow commands





Assessment


Assessment


1. Traumatic fall: left tibial fracture. nonsurgical per ortho


2. Rhabdomyolysis


3. NSTEMI: Peaked at 1.2, no significant changes with EKG by comparison.  

Likely type 2. No cardiac symptoms. EF normal with normal wall motion


4. Acute on chronic diastolic CHF: compensated


5. CAD: PCI/stent in past, unknown date. stable


6. PAD: fempop in the past and RAKA


7. HTN: controlled


8. HLP: HDL low otherwise close to goal from recent lipids. 


9. DM2


10. UTI





Recommendations


1. Pt does not want any invasive cardiac testing after significant discussion. 

Will treat medically


3. Continue with secondary prevention. ASA/plavix


4. Continue current antiHTN, secondary prevention. ASA/plavix


5. Period of HR in the 40s.  Will decrease atenolol. 


6. Follow up in office in 4 weeks.











NANCY HURTADO Jun 20, 2017 11:26

## 2017-06-20 NOTE — PDOC
PROGRESS NOTES


Chief Complaint


Chief Complaint


cc: fall





A/P


1.  Distal tibial fracture in remission.


2.  Status post mechanical fall.


3.  Mild elevation of troponin, N-STEMI.


4.  Hyperglycemia, type 2 diabetes mellitus.


5.  AMS duet o medications, narcotics, resolved. 


6.  Right AKA.


7.  Depression.


8.  Hyperlipidemia.


9.  Hypertension.





Plan 


No surgical plans 


Pt doesn't want any intervention such as cath 


polypharmacy needs limit medications


limit Lasix 


mild iv hydration


unknown baseline status. 


limit beta blockers. 








.





Vitals


Vitals





Vital Signs








  Date Time  Temp Pulse Resp B/P (MAP) Pulse Ox O2 Delivery O2 Flow Rate FiO2


 


6/20/17 09:14  66  114/45    


 


6/20/17 09:13     90 Nasal Cannula 3.0 


 


6/20/17 07:00 98.1  18     





 98.1       











Physical Exam


General:  Alert, Oriented X3, Cooperative, No acute distress


Heart:  Regular rate, Normal S1, Normal S2, No murmurs


Lungs:  Clear


Abdomen:  Soft, No tenderness


Extremities:  No cyanosis, Other (unable to asses LLE due to leg wrap and 

splint but positive for sensation, mobility, toes warm to touch. )





Labs


LABS





Laboratory Tests








Test


  6/19/17


11:30 6/19/17


14:50 6/19/17


16:51 6/19/17


20:48


 


Glucose (Fingerstick)


  185 mg/dL


(70-99) 


  173 mg/dL


(70-99) 180 mg/dL


(70-99)


 


Troponin I Quantitative


  


  0.761 ng/mL


(0.000-0.055) 


  


 


 


Test


  6/20/17


04:05 6/20/17


08:34 


  


 


 


White Blood Count


  8.4 x10^3/uL


(4.0-11.0) 


  


  


 


 


Red Blood Count


  5.18 x10^6/uL


(4.30-5.70) 


  


  


 


 


Hemoglobin


  14.5 g/dL


(13.0-17.5) 


  


  


 


 


Hematocrit


  44.8 %


(39.0-53.0) 


  


  


 


 


Mean Corpuscular Volume 86 fL ()    


 


Mean Corpuscular Hemoglobin 28 pg (25-35)    


 


Mean Corpuscular Hemoglobin


Concent 32 g/dL


(31-37) 


  


  


 


 


Red Cell Distribution Width


  15.9 %


(11.5-14.5) 


  


  


 


 


Platelet Count


  209 x10^3/uL


(140-400) 


  


  


 


 


Neutrophils (%) (Auto) 61 % (31-73)    


 


Lymphocytes (%) (Auto) 27 % (24-48)    


 


Monocytes (%) (Auto) 11 % (0-9)    


 


Eosinophils (%) (Auto) 2 % (0-3)    


 


Basophils (%) (Auto) 1 % (0-3)    


 


Neutrophils # (Auto)


  5.1 x10^3uL


(1.8-7.7) 


  


  


 


 


Lymphocytes # (Auto)


  2.2 x10^3/uL


(1.0-4.8) 


  


  


 


 


Monocytes # (Auto)


  0.9 x10^3/uL


(0.0-1.1) 


  


  


 


 


Eosinophils # (Auto)


  0.1 x10^3/uL


(0.0-0.7) 


  


  


 


 


Basophils # (Auto)


  0.1 x10^3/uL


(0.0-0.2) 


  


  


 


 


Sodium Level


  142 mmol/L


(136-145) 


  


  


 


 


Potassium Level


  3.7 mmol/L


(3.5-5.1) 


  


  


 


 


Chloride Level


  101 mmol/L


() 


  


  


 


 


Carbon Dioxide Level


  38 mmol/L


(21-32) 


  


  


 


 


Anion Gap 3 (6-14)    


 


Blood Urea Nitrogen


  14 mg/dL


(8-26) 


  


  


 


 


Creatinine


  0.9 mg/dL


(0.7-1.3) 


  


  


 


 


Estimated GFR


(Cockcroft-Gault) 85.0 


  


  


  


 


 


Glucose Level


  147 mg/dL


(70-99) 


  


  


 


 


Calcium Level


  8.9 mg/dL


(8.5-10.1) 


  


  


 


 


Glucose (Fingerstick)


  


  136 mg/dL


(70-99) 


  


 











Assessment and Plan


Assessmemt and Plan


Problems


Medical Problems:


(1) Fracture of distal end of tibia


Status: Acute  





(2) Opiate overdose


Status: Acute  








Problems:  





Comment


Review of Relevant


I have reviewed the following items daria (where applicable) has been applied.


Labs





Laboratory Tests








Test


  6/19/17


02:09 6/19/17


07:44 6/19/17


09:05 6/19/17


11:30


 


White Blood Count


  10.9 x10^3/uL


(4.0-11.0) 


  


  


 


 


Red Blood Count


  5.56 x10^6/uL


(4.30-5.70) 


  


  


 


 


Hemoglobin


  15.5 g/dL


(13.0-17.5) 


  


  


 


 


Hematocrit


  48.7 %


(39.0-53.0) 


  


  


 


 


Mean Corpuscular Volume 88 fL ()    


 


Mean Corpuscular Hemoglobin 28 pg (25-35)    


 


Mean Corpuscular Hemoglobin


Concent 32 g/dL


(31-37) 


  


  


 


 


Red Cell Distribution Width


  16.0 %


(11.5-14.5) 


  


  


 


 


Platelet Count


  227 x10^3/uL


(140-400) 


  


  


 


 


Neutrophils (%) (Auto) 67 % (31-73)    


 


Lymphocytes (%) (Auto) 24 % (24-48)    


 


Monocytes (%) (Auto) 8 % (0-9)    


 


Eosinophils (%) (Auto) 1 % (0-3)    


 


Basophils (%) (Auto) 1 % (0-3)    


 


Neutrophils # (Auto)


  7.3 x10^3uL


(1.8-7.7) 


  


  


 


 


Lymphocytes # (Auto)


  2.6 x10^3/uL


(1.0-4.8) 


  


  


 


 


Monocytes # (Auto)


  0.9 x10^3/uL


(0.0-1.1) 


  


  


 


 


Eosinophils # (Auto)


  0.1 x10^3/uL


(0.0-0.7) 


  


  


 


 


Basophils # (Auto)


  0.1 x10^3/uL


(0.0-0.2) 


  


  


 


 


Sodium Level


  140 mmol/L


(136-145) 


  


  


 


 


Potassium Level


  5.0 mmol/L


(3.5-5.1) 


  


  


 


 


Chloride Level


  101 mmol/L


() 


  


  


 


 


Carbon Dioxide Level


  36 mmol/L


(21-32) 


  


  


 


 


Anion Gap 3 (6-14)    


 


Blood Urea Nitrogen


  17 mg/dL


(8-26) 


  


  


 


 


Creatinine


  1.0 mg/dL


(0.7-1.3) 


  


  


 


 


Estimated GFR


(Cockcroft-Gault) 75.2 


  


  


  


 


 


BUN/Creatinine Ratio 17 (6-20)    


 


Glucose Level


  135 mg/dL


(70-99) 


  


  


 


 


Calcium Level


  9.2 mg/dL


(8.5-10.1) 


  


  


 


 


Total Bilirubin


  0.3 mg/dL


(0.2-1.0) 


  


  


 


 


Aspartate Amino Transf


(AST/SGOT) 44 U/L (15-37) 


  


  


  


 


 


Alanine Aminotransferase


(ALT/SGPT) 38 U/L (16-63) 


  


  


  


 


 


Alkaline Phosphatase


  117 U/L


() 


  


  


 


 


Troponin I Quantitative


  1.212 ng/mL


(0.000-0.055) 


  0.887 ng/mL


(0.000-0.055) 


 


 


NT-Pro-B-Type Natriuretic


Peptide 1027 pg/mL


(0-124) 


  


  


 


 


Total Protein


  8.1 g/dL


(6.4-8.2) 


  


  


 


 


Albumin


  3.3 g/dL


(3.4-5.0) 


  


  


 


 


Albumin/Globulin Ratio 0.7 (1.0-1.7)    


 


Glucose (Fingerstick)


  


  185 mg/dL


(70-99) 


  185 mg/dL


(70-99)


 


Creatine Kinase


  


  


  431 U/L


() 


 


 


Myoglobin


  


  


  159 ng/mL


(16-96) 


 


 


Test


  6/19/17


14:50 6/19/17


16:51 6/19/17


20:48 6/20/17


04:05


 


Troponin I Quantitative


  0.761 ng/mL


(0.000-0.055) 


  


  


 


 


Glucose (Fingerstick)


  


  173 mg/dL


(70-99) 180 mg/dL


(70-99) 


 


 


White Blood Count


  


  


  


  8.4 x10^3/uL


(4.0-11.0)


 


Red Blood Count


  


  


  


  5.18 x10^6/uL


(4.30-5.70)


 


Hemoglobin


  


  


  


  14.5 g/dL


(13.0-17.5)


 


Hematocrit


  


  


  


  44.8 %


(39.0-53.0)


 


Mean Corpuscular Volume    86 fL () 


 


Mean Corpuscular Hemoglobin    28 pg (25-35) 


 


Mean Corpuscular Hemoglobin


Concent 


  


  


  32 g/dL


(31-37)


 


Red Cell Distribution Width


  


  


  


  15.9 %


(11.5-14.5)


 


Platelet Count


  


  


  


  209 x10^3/uL


(140-400)


 


Neutrophils (%) (Auto)    61 % (31-73) 


 


Lymphocytes (%) (Auto)    27 % (24-48) 


 


Monocytes (%) (Auto)    11 % (0-9) 


 


Eosinophils (%) (Auto)    2 % (0-3) 


 


Basophils (%) (Auto)    1 % (0-3) 


 


Neutrophils # (Auto)


  


  


  


  5.1 x10^3uL


(1.8-7.7)


 


Lymphocytes # (Auto)


  


  


  


  2.2 x10^3/uL


(1.0-4.8)


 


Monocytes # (Auto)


  


  


  


  0.9 x10^3/uL


(0.0-1.1)


 


Eosinophils # (Auto)


  


  


  


  0.1 x10^3/uL


(0.0-0.7)


 


Basophils # (Auto)


  


  


  


  0.1 x10^3/uL


(0.0-0.2)


 


Sodium Level


  


  


  


  142 mmol/L


(136-145)


 


Potassium Level


  


  


  


  3.7 mmol/L


(3.5-5.1)


 


Chloride Level


  


  


  


  101 mmol/L


()


 


Carbon Dioxide Level


  


  


  


  38 mmol/L


(21-32)


 


Anion Gap    3 (6-14) 


 


Blood Urea Nitrogen


  


  


  


  14 mg/dL


(8-26)


 


Creatinine


  


  


  


  0.9 mg/dL


(0.7-1.3)


 


Estimated GFR


(Cockcroft-Gault) 


  


  


  85.0 


 


 


Glucose Level


  


  


  


  147 mg/dL


(70-99)


 


Calcium Level


  


  


  


  8.9 mg/dL


(8.5-10.1)


 


Test


  6/20/17


08:34 


  


  


 


 


Glucose (Fingerstick)


  136 mg/dL


(70-99) 


  


  


 








Laboratory Tests








Test


  6/19/17


11:30 6/19/17


14:50 6/19/17


16:51 6/19/17


20:48


 


Glucose (Fingerstick)


  185 mg/dL


(70-99) 


  173 mg/dL


(70-99) 180 mg/dL


(70-99)


 


Troponin I Quantitative


  


  0.761 ng/mL


(0.000-0.055) 


  


 


 


Test


  6/20/17


04:05 6/20/17


08:34 


  


 


 


White Blood Count


  8.4 x10^3/uL


(4.0-11.0) 


  


  


 


 


Red Blood Count


  5.18 x10^6/uL


(4.30-5.70) 


  


  


 


 


Hemoglobin


  14.5 g/dL


(13.0-17.5) 


  


  


 


 


Hematocrit


  44.8 %


(39.0-53.0) 


  


  


 


 


Mean Corpuscular Volume 86 fL ()    


 


Mean Corpuscular Hemoglobin 28 pg (25-35)    


 


Mean Corpuscular Hemoglobin


Concent 32 g/dL


(31-37) 


  


  


 


 


Red Cell Distribution Width


  15.9 %


(11.5-14.5) 


  


  


 


 


Platelet Count


  209 x10^3/uL


(140-400) 


  


  


 


 


Neutrophils (%) (Auto) 61 % (31-73)    


 


Lymphocytes (%) (Auto) 27 % (24-48)    


 


Monocytes (%) (Auto) 11 % (0-9)    


 


Eosinophils (%) (Auto) 2 % (0-3)    


 


Basophils (%) (Auto) 1 % (0-3)    


 


Neutrophils # (Auto)


  5.1 x10^3uL


(1.8-7.7) 


  


  


 


 


Lymphocytes # (Auto)


  2.2 x10^3/uL


(1.0-4.8) 


  


  


 


 


Monocytes # (Auto)


  0.9 x10^3/uL


(0.0-1.1) 


  


  


 


 


Eosinophils # (Auto)


  0.1 x10^3/uL


(0.0-0.7) 


  


  


 


 


Basophils # (Auto)


  0.1 x10^3/uL


(0.0-0.2) 


  


  


 


 


Sodium Level


  142 mmol/L


(136-145) 


  


  


 


 


Potassium Level


  3.7 mmol/L


(3.5-5.1) 


  


  


 


 


Chloride Level


  101 mmol/L


() 


  


  


 


 


Carbon Dioxide Level


  38 mmol/L


(21-32) 


  


  


 


 


Anion Gap 3 (6-14)    


 


Blood Urea Nitrogen


  14 mg/dL


(8-26) 


  


  


 


 


Creatinine


  0.9 mg/dL


(0.7-1.3) 


  


  


 


 


Estimated GFR


(Cockcroft-Gault) 85.0 


  


  


  


 


 


Glucose Level


  147 mg/dL


(70-99) 


  


  


 


 


Calcium Level


  8.9 mg/dL


(8.5-10.1) 


  


  


 


 


Glucose (Fingerstick)


  


  136 mg/dL


(70-99) 


  


 








Medications





Current Medications


Naloxone HCl (Narcan) 0.4 mg STK-MED ONCE .ROUTE ;  Start 6/19/17 at 01:56;  

Stop 6/19/17 at 01:57;  Status DC


Naloxone HCl (Narcan) 0.8 mg 1X  ONCE IV ;  Start 6/19/17 at 02:30;  Stop 6/19/ 17 at 02:31;  Status DC


Naloxone HCl (Narcan) 0.8 mg 1X  ONCE IM  Last administered on 6/19/17at 02:00;

  Start 6/19/17 at 03:00;  Stop 6/19/17 at 03:01;  Status DC


Fentanyl Citrate (Fentanyl 2ml Vial) 50 mcg 1X  ONCE IV  Last administered on 6/ 19/17at 02:48;  Start 6/19/17 at 03:00;  Stop 6/19/17 at 03:01;  Status DC


Furosemide (Lasix) 40 mg 1X  ONCE IVP  Last administered on 6/19/17at 03:45;  

Start 6/19/17 at 03:30;  Stop 6/19/17 at 03:31;  Status DC


Aspirin (Bj Aspirin) 325 mg 1X  ONCE PO  Last administered on 6/19/17at 03:45

;  Start 6/19/17 at 03:30;  Stop 6/19/17 at 03:31;  Status DC


Fentanyl Citrate (Fentanyl 2ml Vial) 50 mcg PRN Q15MIN  PRN IV PAIN GREATER 

THAN 3/10 Last administered on 6/19/17at 05:59;  Start 6/19/17 at 03:15;  Stop 6 /20/17 at 03:14;  Status DC


Ondansetron HCl (Zofran) 4 mg PRN Q8HRS  PRN IV NAUSEA/VOMITING;  Start 6/19/17 

at 03:15;  Stop 6/20/17 at 03:14;  Status DC


Morphine Sulfate 2 mg PRN Q2HR  PRN IV SEVERE PAIN Last administered on 6/19/ 17at 13:24;  Start 6/19/17 at 03:15;  Stop 6/20/17 at 03:14;  Status DC


Acetaminophen (Tylenol) 650 mg PRN Q4HRS  PRN PO FEVER;  Start 6/19/17 at 03:15

;  Stop 6/20/17 at 03:14;  Status DC


Nitroglycerin (Nitrostat) 0.4 mg PRN Q5MIN  PRN SL CHEST PAIN;  Start 6/19/17 

at 03:15;  Stop 6/20/17 at 03:14;  Status DC


Acetaminophen (Tylenol) 325 mg PRN Q6HRS  PRN PO MILD PAIN / TEMP;  Start 6/19/ 17 at 09:00


Acetaminophen/ Hydrocodone Bitart (Lortab 5/325) 1 tab PRN Q6HRS  PRN PO 

MODERATE TO SEVERE PAIN Last administered on 6/20/17at 09:13;  Start 6/19/17 at 

09:00


Hydralazine HCl (Apresoline) 10 mg PRN Q4HRS  PRN IVP ELEVATED BP, SEE COMMENTS

;  Start 6/19/17 at 09:00


Ondansetron HCl (Zofran) 4 mg PRN Q8HRS  PRN IV NAUSEA/VOMITING;  Start 6/19/17 

at 09:00


Albuterol Sulfate (Ventolin Neb Soln) 2.5 mg PRN Q4HRS  PRN NEB SHORTNESS OF 

BREATH;  Start 6/19/17 at 09:00


Amlodipine Besylate (Norvasc) 10 mg DAILY PO  Last administered on 6/19/17at 10:

31;  Start 6/19/17 at 10:30


Atenolol (Tenormin) 50 mg BID PO  Last administered on 6/20/17at 09:14;  Start 6 /19/17 at 10:30


Furosemide (Lasix) 40 mg DAILY PO  Last administered on 6/20/17at 09:14;  Start 

6/19/17 at 10:30


Enoxaparin Sodium (Lovenox 40mg Syringe) 40 mg Q24H SQ  Last administered on 6/ 19/17at 13:25;  Start 6/19/17 at 12:00


Aspirin (Ecotrin) 81 mg DAILYWBKFT PO  Last administered on 6/20/17at 09:11;  

Start 6/20/17 at 08:00


Clopidogrel Bisulfate (Plavix) 75 mg DAILYWBKFT PO  Last administered on 6/20/ 17at 09:14;  Start 6/19/17 at 14:00


Atorvastatin Calcium (Lipitor) 10 mg QHS PO ;  Start 6/19/17 at 21:00;  Status 

Cancel


Aspirin (Children'S Aspirin) 81 mg DAILY PO ;  Start 6/20/17 at 09:00;  Status 

UNV


Baclofen (Lioresal) 10 mg BID PO  Last administered on 6/20/17at 09:14;  Start 6 /19/17 at 21:00


Losartan Potassium (Cozaar) 100 mg DAILY PO  Last administered on 6/19/17at 16:

31;  Start 6/19/17 at 15:30


Nitroglycerin (Nitrostat) 0.4 mg PRN Q5MIN  PRN SL CHEST PAIN;  Start 6/19/17 

at 15:00


Silver Sulfadiazine (Silvadene) 1 familia DAILY TP ;  Start 6/20/17 at 09:00


Temazepam (Restoril) 30 mg QHS PO  Last administered on 6/19/17at 21:35;  Start 

6/19/17 at 21:00


Escitalopram Oxalate (Lexapro) 10 mg QHS PO  Last administered on 6/19/17at 21:

34;  Start 6/19/17 at 21:00


Insulin Detemir (Levemir) 60 units DAILY SQ  Last administered on 6/19/17at 16:

33;  Start 6/19/17 at 15:30


Insulin Aspart (NovoLOG) 6 units TIDAC SQ  Last administered on 6/19/17at 16:34

;  Start 6/19/17 at 16:30


Oxycodone HCl (Roxicodone) 15 mg PRN Q6HRS  PRN PO PAIN Last administered on 6/ 20/17at 03:20;  Start 6/19/17 at 15:00


Potassium Chloride (Klor-Con) 10 meq DAILYWBKFT PO  Last administered on 6/20/ 17at 09:13;  Start 6/20/17 at 08:00


Atorvastatin Calcium (Lipitor) 80 mg QHS PO  Last administered on 6/19/17at 21:

34;  Start 6/19/17 at 21:00





Active Scripts


Active


Ms Contin (Morphine Sulfate) 30 Mg Tablet.er 30 Tab PO BID


Vancomycin Hcl 500 Mg Vial 250 Mg PO [id number] 7 Days


Amoxicillin 875 Mg Tablet 1 Tab PO BID


Reported


Silvadene (Silver Sulfadiazine) 20 Gm Cream..g. 1 Familia TP DAILY


Nystatin 15 Gm Powder 1 Familia TP BID


Colace (Docusate Sodium) 100 Mg Capsule 100 Mg PO PRN DAILY PRN


Zinc Oxide 30 Gm Oint...g. 30 Gm TP BID


     karlos inner buttocks


Vitamin D3 (Cholecalciferol (Vitamin D3)) 5,000 Unit Tablet 1 Tab PO DAILY


Stress B With Zinc Tablet (Multivits,Stress Formula/Zinc) 1 Each Tablet 1 Each 

PO DAILY


Soma (Carisoprodol) 350 Mg Tablet 1 Tab PO HS


Potassium Chloride 10 Meq Capsule.er 10 Meq PO DAILY


Oxycodone Hcl 15 Mg Tablet 1 Tab PO Q6HRS PRN


Norvasc (Amlodipine Besylate) 10 Mg Tablet 10 Mg PO DAILY


Nitrostat (Nitroglycerin) 0.4 Mg Tab.subl 0.4 Mg SL PRN Q5MIN PRN


Magnesium (Magnesium Oxide) 400 Mg Capsule 400 Mg PO DAILY


Lyrica (Pregabalin) 75 Mg Capsule 75 Mg PO TID


Escitalopram Oxalate 10 Mg Tablet 1 Tab PO HS


Lasix (Furosemide) 40 Mg Tablet 1 Tab PO DAILY


Lantus (Insulin Glargine,Hum.rec.anlog) 100 Unit/1 Ml Vial 60 Unit SQ DAILY


Imodium A-D (Loperamide HCl) 2 Mg Capsule 2 Mg PO PRN PRN


     after each loose stool up to 16mg in 24 hours


Humalog (Insulin Lispro) 100 Unit/1 Ml Insuln.pen 0-8 Unit SQ TIDAC


     if fs bs


     150-200= 1 unit


     201-250= 2 units


     250-300= 4 units


     301-350= 6 units


     351-400= 8 units


     >401 call dr. Silverman (Insulin Lispro) 100 Unit/1 Ml Cartridge 6 Unit SQ TIDAC


Baclofen 10 Mg Tablet 1 Tab PO BID


Ascorbic Acid 500 Mg Tablet 500 Mg PO DAILY


Androgel (Testosterone) 5 Gm Gel.packet 2.5 Gm TP QODAY


Tylenol (Acetaminophen) 325 Mg Tablet 650 Mg PO Q6HRS PRN


Carisoprodol 350 Mg Tablet 350 Mg PO Q8HRS PRN


Temazepam 30 Mg Capsule 1 Cap PO QHS


Crestor (Rosuvastatin Calcium) 40 Mg Tablet 20 Mg PO QHS


Losartan Potassium 50 Mg Tablet 100 Mg PO DAILY


Atenolol 50 Mg Tablet 1 Tab PO BID


Metformin Hcl 500 Mg Tablet 1 Tab PO BID


Aspirin 81 Mg Tab.chew 81 Mg PO DAILY


Sennosides-Docusate Sodium Tab (Sennosides/Docusate Sodium) 1 Each Tablet 2 

Each PO BID


Miralax (Polyethylene Glycol 3350) 17 Gm Powd.pack 17 Gm PO PRN PRN


Vitals/I & O





Vital Sign - Last 24 Hours








 6/19/17 6/19/17 6/19/17 6/19/17





 09:51 10:21 10:31 10:32


 


Pulse   69 69


 


Resp 16   


 


B/P (MAP)   108/57 108/57


 


Pulse Ox 93 93  


 


O2 Delivery Nasal Cannula Nasal Cannula  


 


O2 Flow Rate 4.0 4.0  





 6/19/17 6/19/17 6/19/17 6/19/17





 10:49 11:15 11:58 13:24


 


Temp 98.2 98.2  





 98.2 98.2  


 


Pulse 64 64  


 


Resp 20 20  18


 


B/P (MAP) 116/58 (77) 116/58 (77)  


 


Pulse Ox 91 91 98 98


 


O2 Delivery Nasal Cannula Nasal Cannula Nasal Cannula Nasal Cannula


 


O2 Flow Rate   2.0 2.0


 


    





    





 6/19/17 6/19/17 6/19/17 6/19/17





 13:24 15:41 16:31 19:00


 


Temp  97.5  98.1





  97.5  98.1


 


Pulse  60 60 64


 


Resp 18 20  15


 


B/P (MAP)  106/60 (75) 106/60 143/64 (90)


 


Pulse Ox 98 95  93


 


O2 Delivery Nasal Cannula Nasal Cannula  Nasal Cannula


 


O2 Flow Rate 2.0   3.0


 


    





    





 6/19/17 6/19/17 6/19/17 6/19/17





 20:00 21:35 21:36 22:36


 


Pulse  60  


 


Resp   18 18


 


B/P (MAP)  143/64  


 


Pulse Ox   93 93


 


O2 Delivery Nasal Cannula  Nasal Cannula Nasal Cannula


 


O2 Flow Rate 4.0  3.0 3.0





 6/19/17 6/20/17 6/20/17 6/20/17





 23:00 03:00 03:20 04:20


 


Temp 98.3 97.8  





 98.3 97.8  


 


Pulse 71 61  


 


Resp 16 18 18 18


 


B/P (MAP) 98/59 (72) 127/56 (79)  


 


Pulse Ox 93 93 93 93


 


O2 Delivery Nasal Cannula Nasal Cannula Nasal Cannula Nasal Cannula


 


O2 Flow Rate 3.0 3.0 3.0 3.0


 


    





    





 6/20/17 6/20/17 6/20/17 6/20/17





 07:00 09:00 09:13 09:14


 


Temp 98.1   





 98.1   


 


Pulse 66 66  66


 


Resp 18   


 


B/P (MAP) 114/45 (68) 114/45  114/45


 


Pulse Ox 90  90 


 


O2 Delivery Nasal Cannula  Nasal Cannula 


 


O2 Flow Rate 3.0  3.0 














Intake and Output   


 


 6/19/17 6/19/17 6/20/17





 15:00 23:00 07:00


 


Intake Total 240 ml  100 ml


 


Output Total  2400 ml 400 ml


 


Balance 240 ml -2400 ml -300 ml

















SANDY BASS MD Jun 20, 2017 09:29

## 2017-06-20 NOTE — EKG
[facility]

              [address] [place] [id number]

Test Date:    2017               Test Time:    17:55:53

Pat Name:     ZUNILDA MCMANUS              Department:   

Patient ID:   [id number]           Room:         201 1

Gender:       M                        Technician:   

:          [id number]               Requested By: JODIE KASPER

Order Number: [id number]            Reading MD:   Ankush Rodriguez

                                 Measurements

Intervals                              Axis          

Rate:         57                       P:            56

CT:           200                      QRS:          -46

QRSD:         90                       T:            -56

QT:           422                                    

QTc:          414                                    

                           Interpretive Statements

SINUS RHYTHM

ABNORMAL LEFT AXIS DEVIATION

LEFT ANTERIOR FASCICULAR BLOCK

QRS(T) CONTOUR ABNORMALITY

CONSIDER ANTEROSEPTAL MYOCARDIAL DAMAGE

ST & T ABNORMALITY, CONSIDER

INFERIOR ISCHEMIA OR LEFT VENTRICULAR STRAIN

ABNORMAL ECG

RI6.01



Electronically Signed On 2017 11:18:09 CDT by Ankush Rodriguez

## 2017-06-21 VITALS — SYSTOLIC BLOOD PRESSURE: 140 MMHG | DIASTOLIC BLOOD PRESSURE: 66 MMHG

## 2017-06-21 VITALS — DIASTOLIC BLOOD PRESSURE: 65 MMHG | SYSTOLIC BLOOD PRESSURE: 146 MMHG

## 2017-06-21 VITALS — DIASTOLIC BLOOD PRESSURE: 62 MMHG | SYSTOLIC BLOOD PRESSURE: 134 MMHG

## 2017-06-21 LAB
ANION GAP SERPL CALC-SCNC: 5 MMOL/L (ref 6–14)
BASOPHILS # BLD AUTO: 0.1 X10^3/UL (ref 0–0.2)
BASOPHILS NFR BLD: 1 % (ref 0–3)
BUN SERPL-MCNC: 11 MG/DL (ref 8–26)
CALCIUM SERPL-MCNC: 8.8 MG/DL (ref 8.5–10.1)
CHLORIDE SERPL-SCNC: 101 MMOL/L (ref 98–107)
CO2 SERPL-SCNC: 37 MMOL/L (ref 21–32)
CREAT SERPL-MCNC: 0.8 MG/DL (ref 0.7–1.3)
EOSINOPHIL NFR BLD: 3 % (ref 0–3)
ERYTHROCYTE [DISTWIDTH] IN BLOOD BY AUTOMATED COUNT: 15.7 % (ref 11.5–14.5)
GFR SERPLBLD BASED ON 1.73 SQ M-ARVRAT: 97.3 ML/MIN
GLUCOSE SERPL-MCNC: 128 MG/DL (ref 70–99)
HCT VFR BLD CALC: 43.9 % (ref 39–53)
HGB BLD-MCNC: 14.7 G/DL (ref 13–17.5)
LYMPHOCYTES # BLD: 2.7 X10^3/UL (ref 1–4.8)
LYMPHOCYTES NFR BLD AUTO: 31 % (ref 24–48)
MCH RBC QN AUTO: 28 PG (ref 25–35)
MCHC RBC AUTO-ENTMCNC: 34 G/DL (ref 31–37)
MCV RBC AUTO: 84 FL (ref 79–100)
MONOCYTES NFR BLD: 11 % (ref 0–9)
NEUTROPHILS NFR BLD AUTO: 55 % (ref 31–73)
PLATELET # BLD AUTO: 223 X10^3/UL (ref 140–400)
POTASSIUM SERPL-SCNC: 3.3 MMOL/L (ref 3.5–5.1)
RBC # BLD AUTO: 5.23 X10^6/UL (ref 4.3–5.7)
SODIUM SERPL-SCNC: 143 MMOL/L (ref 136–145)
WBC # BLD AUTO: 8.8 X10^3/UL (ref 4–11)

## 2017-06-21 RX ADMIN — FUROSEMIDE SCH MG: 40 TABLET ORAL at 09:07

## 2017-06-21 RX ADMIN — INSULIN DETEMIR SCH UNITS: 100 INJECTION, SOLUTION SUBCUTANEOUS at 09:17

## 2017-06-21 RX ADMIN — POTASSIUM CHLORIDE SCH MEQ: 750 TABLET, FILM COATED, EXTENDED RELEASE ORAL at 09:06

## 2017-06-21 RX ADMIN — SILVER SULFADIAZINE SCH APP: 10 CREAM TOPICAL at 09:00

## 2017-06-21 RX ADMIN — INSULIN ASPART SCH UNITS: 100 INJECTION, SOLUTION INTRAVENOUS; SUBCUTANEOUS at 09:16

## 2017-06-21 RX ADMIN — ENOXAPARIN SODIUM SCH MG: 40 INJECTION SUBCUTANEOUS at 12:00

## 2017-06-21 RX ADMIN — INSULIN ASPART SCH UNITS: 100 INJECTION, SOLUTION INTRAVENOUS; SUBCUTANEOUS at 12:30

## 2017-06-21 RX ADMIN — BACLOFEN SCH MG: 10 TABLET ORAL at 09:00

## 2017-06-21 RX ADMIN — HYDROCODONE BITARTRATE AND ACETAMINOPHEN PRN TAB: 5; 325 TABLET ORAL at 04:02

## 2017-06-21 RX ADMIN — LOSARTAN POTASSIUM SCH MG: 50 TABLET ORAL at 09:07

## 2017-06-21 RX ADMIN — ASPIRIN SCH MG: 81 TABLET, COATED ORAL at 09:06

## 2017-06-21 RX ADMIN — CLOPIDOGREL BISULFATE SCH MG: 75 TABLET ORAL at 09:06

## 2017-06-27 NOTE — PDOC3
Discharge Summary*


Date of Discharge:  Jun 21, 2017


Admitting Diagnosis


Problems


Medical Problems:


(1) Fracture of distal end of tibia


Status: Acute  





(2) Opiate overdose


Status: Acute  








Problems:  


Final Diagnosis


1.  Distal tibial fracture 


2.  Status post mechanical fall.


3.  Mild elevation of troponin, N-STEMI.


4.  Hyperglycemia, type 2 diabetes mellitus.


5.  AMS duet o medications, narcotics, resolved. 


6.  Right AKA.


7.  Depression.


8.  Hyperlipidemia.


9.  Hypertension.


Brief Hospital Course


Mr. Wells  is a 62 old Male who presented with  fall and distal tibial fracture

, however, per orthopedics he is not a candidate for surgery, recommend non 

weight bearing and walking boots. Also he had mild elevation of troponin, Echo 

showed normal LVEF, but Pt declined for any invasive procedure, so cardiology 

signed off. Pt has polypharmacy and recommend to limit narcotics and any 

sedative to avoid further altered mental state. At the time DC he is stable, 

mental state is clear, and at this baseline. 





exam 


GENERAL:       No apparent distress. Alert and oriented times 3


HEENT:            Head normocephalic, atraumatic.


NECK:              Supple


LUNGS:            Clear to auscultation.


HEART:            RRR, S1, S2 present, pulses intact


ABDOMEN:       Soft, positive bowel sounds.


Disposition/Orders:  Other


CONDITION AT DISCHARGE:  Improved


Scheduled


Amlodipine Besylate (Norvasc), 10 MG PO DAILY, (Reported)


Amoxicillin (Amoxicillin), 1 TAB PO BID


Ascorbic Acid (Ascorbic Acid), 500 MG PO DAILY, (Reported)


Aspirin (Aspirin), 81 MG PO DAILY, (Reported)


Atenolol (Atenolol), 1 TAB PO BID, (Reported)


Baclofen (Baclofen), 1 TAB PO BID, (Reported)


Carisoprodol (Soma), 1 TAB PO HS, (Reported)


Cholecalciferol (Vitamin D3) (Vitamin D3), 1 TAB PO DAILY, (Reported)


Escitalopram Oxalate (Escitalopram Oxalate), 1 TAB PO HS, (Reported)


Furosemide (Lasix), 1 TAB PO DAILY, (Reported)


Insulin Glargine,Hum.rec.anlog (Lantus), 60 UNIT SQ DAILY, (Reported)


Insulin Lispro (Humalog), 6 UNIT SQ TIDAC, (Reported)


Insulin Lispro (Humalog), 0-8 UNIT SQ TIDAC, (Reported)


Losartan Potassium (Losartan Potassium), 100 MG PO DAILY, (Reported)


Magnesium Oxide (Magnesium), 400 MG PO DAILY, (Reported)


Metformin Hcl (Metformin Hcl), 1 TAB PO BID, (Reported)


Morphine Sulfate Er (Ms Contin), 30 TAB PO BID


Multivits,Stress Formula/Zinc (Stress B With Zinc Tablet), 1 EACH PO DAILY, (

Reported)


Nystatin (Nystatin), 1 DONNA TP BID, (Reported)


Potassium Chloride (Potassium Chloride), 10 MEQ PO DAILY, (Reported)


Pregabalin (Lyrica), 75 MG PO TID, (Reported)


Rosuvastatin Calcium (Crestor), 20 MG PO QHS, (Reported)


Sennosides/Docusate Sodium (Sennosides-Docusate Sodium Tab), 2 EACH PO BID, (

Reported)


Silver Sulfadiazine (Silvadene), 1 DONNA TP DAILY, (Reported)


Temazepam (Temazepam), 1 CAP PO QHS, (Reported)


Testosterone (Androgel), 2.5 GM TP QODAY, (Reported)


Vancomycin Hcl (Vancomycin Hcl), 250 MG PO [id number]


Zinc Oxide (Zinc Oxide), 30 GM TP BID, (Reported)





Scheduled PRN


Acetaminophen (Tylenol), 650 MG PO Q6HRS PRN for PAIN, (Reported)


Carisoprodol (Carisoprodol), 350 MG PO Q8HRS PRN for PAIN, (Reported)


Docusate Sodium (Colace), 100 MG PO PRN DAILY PRN for CONSTIPATION, (Reported)


Loperamide HCl (Imodium A-D), 2 MG PO PRN PRN for DIARRHEA, (Reported)


Nitroglycerin (Nitrostat), 0.4 MG SL PRN Q5MIN PRN for CHEST PAIN, (Reported)


Oxycodone Hcl (Oxycodone Hcl), 1 TAB PO Q6HRS PRN for PAIN, (Reported)


Polyethylene Glycol 3350 (Miralax), 17 GM PO PRN PRN for CONSTIPATION, (Reported

)


FOLLOW UP APPOINTMENT:  


WITH PCP in 2-4 weeks


Time Spent


Total time spent with patient 43 minutes for coordination of care, counseling, 

and education.











SANDY BASS MD Jun 27, 2017 10:13

## 2018-07-14 ENCOUNTER — HOSPITAL ENCOUNTER (EMERGENCY)
Dept: HOSPITAL 61 - ER | Age: 63
LOS: 1 days | Discharge: HOME | End: 2018-07-15
Payer: COMMERCIAL

## 2018-07-14 DIAGNOSIS — Z88.1: ICD-10-CM

## 2018-07-14 DIAGNOSIS — E11.9: ICD-10-CM

## 2018-07-14 DIAGNOSIS — E78.00: ICD-10-CM

## 2018-07-14 DIAGNOSIS — N39.0: Primary | ICD-10-CM

## 2018-07-14 DIAGNOSIS — J45.909: ICD-10-CM

## 2018-07-14 DIAGNOSIS — I10: ICD-10-CM

## 2018-07-14 DIAGNOSIS — I25.10: ICD-10-CM

## 2018-07-14 DIAGNOSIS — F32.9: ICD-10-CM

## 2018-07-14 LAB
ADD MAN DIFF?: NO
ALBUMIN SERPL-MCNC: 3.4 G/DL (ref 3.4–5)
ALBUMIN/GLOB SERPL: 0.8 {RATIO} (ref 1–1.7)
ALP SERPL-CCNC: 89 U/L (ref 46–116)
ALT (SGPT): 31 U/L (ref 16–63)
ANION GAP SERPL CALC-SCNC: 9 MMOL/L (ref 6–14)
AST SERPL-CCNC: 47 U/L (ref 15–37)
BACTERIA,URINE: (no result) /HPF
BASO #: 0.1 X10^3/UL (ref 0–0.2)
BASO %: 1 % (ref 0–3)
BILIRUBIN,URINE: NEGATIVE
BLOOD UREA NITROGEN: 24 MG/DL (ref 8–26)
BUN/CREAT SERPL: 20 (ref 6–20)
CALCIUM: 8.9 MG/DL (ref 8.5–10.1)
CHLORIDE: 103 MMOL/L (ref 98–107)
CLARITY,URINE: (no result)
CO2 SERPL-SCNC: 30 MMOL/L (ref 21–32)
COLOR,URINE: YELLOW
CREAT SERPL-MCNC: 1.2 MG/DL (ref 0.7–1.3)
EOS #: 0.1 X10^3/UL (ref 0–0.7)
EOS %: 2 % (ref 0–3)
GFR SERPLBLD BASED ON 1.73 SQ M-ARVRAT: 60.8 ML/MIN
GLOBULIN SER-MCNC: 4.3 G/DL (ref 2.2–3.8)
GLUCOSE SERPL-MCNC: 157 MG/DL (ref 70–99)
GLUCOSE,URINE: NEGATIVE MG/DL
HCG SERPL-ACNC: 9.6 X10^3/UL (ref 4–11)
HEMATOCRIT: 48.7 % (ref 39–53)
HEMOGLOBIN: 16 G/DL (ref 13–17.5)
LYMPH #: 2.2 X10^3/UL (ref 1–4.8)
LYMPH %: 23 % (ref 24–48)
MEAN CORPUSCULAR HEMOGLOBIN: 26 PG (ref 25–35)
MEAN CORPUSCULAR HGB CONC: 33 G/DL (ref 31–37)
MEAN CORPUSCULAR VOLUME: 79 FL (ref 79–100)
MONO #: 1 X10^3/UL (ref 0–1.1)
MONO %: 11 % (ref 0–9)
NEUT #: 6.2 X10^3UL (ref 1.8–7.7)
NEUT %: 64 % (ref 31–73)
NITRITE,URINE: POSITIVE
PH,URINE: 7
PLATELET COUNT: 216 X10^3/UL (ref 140–400)
POTASSIUM SERPL-SCNC: 3.9 MMOL/L (ref 3.5–5.1)
PROTEIN,URINE: NEGATIVE MG/DL
RBC,URINE: (no result) /HPF (ref 0–2)
RED BLOOD COUNT: 6.19 X10^6/UL (ref 4.3–5.7)
RED CELL DISTRIBUTION WIDTH: 17.9 % (ref 11.5–14.5)
SODIUM: 142 MMOL/L (ref 136–145)
SPECIFIC GRAVITY,URINE: 1.01
SQUAMOUS EPITHELIAL CELL,UR: (no result) /LPF
TOTAL BILIRUBIN: 0.3 MG/DL (ref 0.2–1)
TOTAL PROTEIN: 7.7 G/DL (ref 6.4–8.2)
UROBILINOGEN,URINE: 0.2 MG/DL
WBC,URINE: >40 /HPF (ref 0–4)

## 2018-07-14 PROCEDURE — 80053 COMPREHEN METABOLIC PANEL: CPT

## 2018-07-14 PROCEDURE — 96372 THER/PROPH/DIAG INJ SC/IM: CPT

## 2018-07-14 PROCEDURE — 36415 COLL VENOUS BLD VENIPUNCTURE: CPT

## 2018-07-14 PROCEDURE — 99284 EMERGENCY DEPT VISIT MOD MDM: CPT

## 2018-07-14 PROCEDURE — 85025 COMPLETE CBC W/AUTO DIFF WBC: CPT

## 2018-07-14 PROCEDURE — 81001 URINALYSIS AUTO W/SCOPE: CPT

## 2018-07-14 RX ADMIN — CEFTRIAXONE 1 GM: 1 INJECTION, POWDER, FOR SOLUTION INTRAMUSCULAR; INTRAVENOUS at 22:37

## 2022-04-14 NOTE — EKG
[facility]

              [address] [place] [address]

Test Date:    2017               Test Time:    13:10:25

Pat Name:     ZUNILDA MCMANUS              Department:   

Patient ID:   [id number]           Room:         252 1

Gender:       Male                     Technician:   

:          [id number]               Requested By: JODIE KASPER

Order Number: [id number]            Reading MD:   Blas Garcia

                                 Measurements

Intervals                              Axis          

Rate:         56                       P:            59

MN:           198                      QRS:          -24

QRSD:         88                       T:            -26

QT:           378                                    

QTc:          367                                    

                           Interpretive Statements

SINUS RHYTHM

NON-SPECIFIC ST/T CHANGES

CANNOT RULE OUT ISCHEMIA

Electronically Signed On 5- 15:01:33 CDT by Blas Garcia Arava Counseling:  Patient counseled regarding adverse effects of Arava including but not limited to nausea, vomiting, abnormalities in liver function tests. Patients may develop mouth sores, rash, diarrhea, and abnormalities in blood counts. The patient understands that monitoring is required including LFTs and blood counts.  There is a rare possibility of scarring of the liver and lung problems that can occur when taking methotrexate. Persistent nausea, loss of appetite, pale stools, dark urine, cough, and shortness of breath should be reported immediately. Patient advised to discontinue Arava treatment and consult with a physician prior to attempting conception. The patient will have to undergo a treatment to eliminate Arava from the body prior to conception.